# Patient Record
Sex: MALE | Race: WHITE | Employment: OTHER | URBAN - METROPOLITAN AREA
[De-identification: names, ages, dates, MRNs, and addresses within clinical notes are randomized per-mention and may not be internally consistent; named-entity substitution may affect disease eponyms.]

---

## 2017-12-27 ENCOUNTER — ED HISTORICAL/CONVERTED ENCOUNTER (OUTPATIENT)
Dept: OTHER | Age: 72
End: 2017-12-27

## 2018-05-07 ENCOUNTER — OP HISTORICAL/CONVERTED ENCOUNTER (OUTPATIENT)
Dept: OTHER | Age: 73
End: 2018-05-07

## 2018-11-19 ENCOUNTER — OP HISTORICAL/CONVERTED ENCOUNTER (OUTPATIENT)
Dept: OTHER | Age: 73
End: 2018-11-19

## 2019-02-26 ENCOUNTER — HOSPITAL ENCOUNTER (OUTPATIENT)
Dept: MRI IMAGING | Age: 74
Discharge: HOME | End: 2019-02-26
Attending: ORTHOPAEDIC SURGERY
Payer: MEDICARE

## 2019-02-26 DIAGNOSIS — M25.511 RIGHT SHOULDER PAIN: ICD-10-CM

## 2019-02-26 PROCEDURE — 73221 MRI JOINT UPR EXTREM W/O DYE: CPT

## 2019-06-13 ENCOUNTER — ED HISTORICAL/CONVERTED ENCOUNTER (OUTPATIENT)
Dept: OTHER | Age: 74
End: 2019-06-13

## 2019-06-23 ENCOUNTER — ED HISTORICAL/CONVERTED ENCOUNTER (OUTPATIENT)
Dept: OTHER | Age: 74
End: 2019-06-23

## 2019-06-26 ENCOUNTER — HOSPITAL ENCOUNTER (OUTPATIENT)
Dept: MRI IMAGING | Age: 74
Discharge: HOME | End: 2019-06-26
Attending: INTERNAL MEDICINE
Payer: MEDICARE

## 2019-06-26 DIAGNOSIS — R20.2 PARESTHESIA OF LEFT ARM: ICD-10-CM

## 2019-06-26 PROCEDURE — 70551 MRI BRAIN STEM W/O DYE: CPT

## 2019-09-30 ENCOUNTER — OP HISTORICAL/CONVERTED ENCOUNTER (OUTPATIENT)
Dept: OTHER | Age: 74
End: 2019-09-30

## 2019-10-28 ENCOUNTER — OP HISTORICAL/CONVERTED ENCOUNTER (OUTPATIENT)
Dept: OTHER | Age: 74
End: 2019-10-28

## 2019-10-31 ENCOUNTER — OP HISTORICAL/CONVERTED ENCOUNTER (OUTPATIENT)
Dept: OTHER | Age: 74
End: 2019-10-31

## 2019-11-11 ENCOUNTER — OP HISTORICAL/CONVERTED ENCOUNTER (OUTPATIENT)
Dept: OTHER | Age: 74
End: 2019-11-11

## 2020-07-16 DIAGNOSIS — N52.8 OTHER MALE ERECTILE DYSFUNCTION: ICD-10-CM

## 2020-07-16 DIAGNOSIS — N40.0 BENIGN PROSTATIC HYPERPLASIA WITHOUT LOWER URINARY TRACT SYMPTOMS: ICD-10-CM

## 2020-07-16 DIAGNOSIS — C61 MALIGNANT NEOPLASM OF PROSTATE (HCC): ICD-10-CM

## 2020-07-23 LAB — PSA SERPL-MCNC: 2.6 NG/ML (ref 0–4)

## 2020-07-28 ENCOUNTER — TELEPHONE (OUTPATIENT)
Dept: UROLOGY | Age: 75
End: 2020-07-28

## 2020-08-10 NOTE — PROGRESS NOTES
HISTORY OF PRESENT ILLNESS  Gualberto Vanegas is a 76 y.o. male. He is here for repeat biopsy. He underwent a prostate biopsy 8/31/18. 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 Prolaris testing was less aggressive. PSA has fluctuated. 2.4 3/6/19 on 5ARI. He has been on surveillance. PSA 9/17/19 was 3.1  PSA 3/11/2020 was 2.9  PSA 5/19/2020: 2.8  PSA 7/22/202: 2.6    Problem List  Never Reviewed          Codes Class Noted    Benign prostatic hyperplasia without lower urinary tract symptoms ICD-10-CM: N40.0  ICD-9-CM: 600.00  7/16/2020    Overview Addendum 7/27/2020  2:08 PM by Janet Byers NP     On uroxatral for BPH. He is also on alfuzosin and finasteride. His symptoms are not bothersome. PSA 7/22/2020 is 2.6             Malignant neoplasm of prostate [facility]) ICD-10-CM: C61  ICD-9-CM: 185  7/16/2020    Overview Addendum 8/10/2020 10:39 AM by Janet Byers NP     He underwent a prostate biopsy 8/31/18. 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 Prolaris testing was less aggressive. PSA has fluctuated. 2.4 3/6/19 on 5ARI. He has been on surveillance. PSA 9/17/19 was 3.1  PSA 3/11/2020 was 2.9  PSA 5/19/2020: 2.8  PSA 7/22/202: 2.6; encouraged to proceed with re-biopsy. Other male erectile dysfunction ICD-10-CM: N52.8  ICD-9-CM: 607.84  7/16/2020    Overview Signed 7/16/2020  3:47 PM by Nicholas Oakley LPN     OGE1D contraindicated due to nitrate use. His diabetes is likely also contributory. ROS  Physical Exam      ASSESSMENT and PLAN  Diagnoses and all orders for this visit:    1.  Malignant neoplasm of prostate ([facility] [address]         Jennifer Hutson NP

## 2020-08-11 ENCOUNTER — OFFICE VISIT (OUTPATIENT)
Dept: UROLOGY | Age: 75
End: 2020-08-11
Payer: MEDICARE

## 2020-08-11 VITALS — HEIGHT: 71 IN | WEIGHT: 200 LBS | TEMPERATURE: 98.5 F | BODY MASS INDEX: 28 KG/M2

## 2020-08-11 DIAGNOSIS — C61 MALIGNANT NEOPLASM OF PROSTATE (HCC): ICD-10-CM

## 2020-08-11 PROCEDURE — 76872 US TRANSRECTAL: CPT | Performed by: UROLOGY

## 2020-08-11 PROCEDURE — 55700 PR BIOPSY OF PROSTATE,NEEDLE/PUNCH: CPT | Performed by: UROLOGY

## 2020-08-11 PROCEDURE — 76942 ECHO GUIDE FOR BIOPSY: CPT | Performed by: UROLOGY

## 2020-08-11 RX ORDER — ISOSORBIDE MONONITRATE 30 MG/1
TABLET, EXTENDED RELEASE ORAL
COMMUNITY
Start: 2020-08-07

## 2020-08-11 RX ORDER — FLUCONAZOLE 100 MG/1
TABLET ORAL
COMMUNITY
Start: 2020-05-23 | End: 2021-02-23 | Stop reason: ALTCHOICE

## 2020-08-11 RX ORDER — ALFUZOSIN HYDROCHLORIDE 10 MG/1
TABLET, EXTENDED RELEASE ORAL
COMMUNITY
Start: 2020-05-26 | End: 2022-06-28 | Stop reason: SDUPTHER

## 2020-08-11 RX ORDER — GABAPENTIN 100 MG/1
CAPSULE ORAL
COMMUNITY
Start: 2020-05-26 | End: 2021-02-23 | Stop reason: ALTCHOICE

## 2020-08-11 RX ORDER — FOSINOPIRL SODIUM 10 MG/1
TABLET ORAL
COMMUNITY
Start: 2020-08-07

## 2020-08-11 RX ORDER — METFORMIN HYDROCHLORIDE 500 MG/1
TABLET ORAL
COMMUNITY
Start: 2020-08-07

## 2020-08-11 RX ORDER — CLOPIDOGREL BISULFATE 75 MG/1
TABLET ORAL
COMMUNITY
Start: 2020-06-06

## 2020-08-11 RX ORDER — CIPROFLOXACIN 500 MG/1
TABLET ORAL
COMMUNITY
Start: 2020-05-26 | End: 2021-02-23 | Stop reason: ALTCHOICE

## 2020-08-11 RX ORDER — FINASTERIDE 5 MG/1
TABLET, FILM COATED ORAL
COMMUNITY
Start: 2020-05-26 | End: 2021-01-20 | Stop reason: SDUPTHER

## 2020-08-11 RX ORDER — ATORVASTATIN CALCIUM 20 MG/1
TABLET, FILM COATED ORAL
COMMUNITY
Start: 2020-05-24

## 2020-08-11 RX ORDER — METOPROLOL SUCCINATE 25 MG/1
50 TABLET, EXTENDED RELEASE ORAL
COMMUNITY
Start: 2020-06-06 | End: 2020-12-04 | Stop reason: SDUPTHER

## 2020-08-11 RX ORDER — ENEMA 19; 7 G/133ML; G/133ML
ENEMA RECTAL
COMMUNITY
Start: 2020-05-26 | End: 2021-02-23 | Stop reason: ALTCHOICE

## 2020-08-11 NOTE — PATIENT INSTRUCTIONS
Use Tylenol for pain. Do NOT use medications such as aspirin, ibuprofen or Aleve for 7 days. Avoid dehydration. Drink plenty of water. Expect blood per the rectum 1-2 days. Expect intermittent blood in the urine or semen for weeks. No straining or heavy lifting for 2 days. Finish the antibiotics prescribed already.

## 2020-08-11 NOTE — PROGRESS NOTES
Prostate biopsy / transrectal ultrasound guidance/rectal ultrasonography    Indications: Elevated PSA, prostate cancer    Prep: Cipro 500 mg, fleets enema    Description: The patient was placed in the left lateral decubitus position. A digital rectal exam was performed. The multiplanar transrectal ultrasound probe was introduced and multiplanar echography was performed. The prostate measurements are as follows:  (cm)  Length: 3.91  Height: 3.98  Width: 5.23  Prostate volume (0.52 x L X W X H): 42.44 cc    There were prostate calcifications noted. There were no suspicious hypoechoic areas noted. Prostate block was performed with 1% lidocaine at the apex and base bilaterally for a total of 6 cc. Using a 18g core biopsy needle, samples were taken from apex to the base laterally and more medially in the standard 12 zone pattern. 14 samples were taken and submitted in 12 zones. The patient tolerated well. He was given postprocedural instructions.

## 2020-08-17 ENCOUNTER — TELEPHONE (OUTPATIENT)
Dept: UROLOGY | Age: 75
End: 2020-08-17

## 2020-08-17 NOTE — TELEPHONE ENCOUNTER
pts biopsy results are still pending, can you please call and reschedule him a couple days out, maybe Friday at 6?

## 2020-08-21 ENCOUNTER — OFFICE VISIT (OUTPATIENT)
Dept: UROLOGY | Age: 75
End: 2020-08-21
Payer: MEDICARE

## 2020-08-21 VITALS
BODY MASS INDEX: 28 KG/M2 | WEIGHT: 200 LBS | HEART RATE: 78 BPM | OXYGEN SATURATION: 96 % | TEMPERATURE: 97.4 F | HEIGHT: 71 IN

## 2020-08-21 DIAGNOSIS — N40.0 BENIGN PROSTATIC HYPERPLASIA WITHOUT LOWER URINARY TRACT SYMPTOMS: ICD-10-CM

## 2020-08-21 DIAGNOSIS — C61 MALIGNANT NEOPLASM OF PROSTATE (HCC): ICD-10-CM

## 2020-08-21 PROCEDURE — G8427 DOCREV CUR MEDS BY ELIG CLIN: HCPCS | Performed by: UROLOGY

## 2020-08-21 PROCEDURE — 99213 OFFICE O/P EST LOW 20 MIN: CPT | Performed by: UROLOGY

## 2020-08-21 NOTE — ASSESSMENT & PLAN NOTE
Low risk prostate cancer on biopsy 2018, not seen on repeat biopsy 8/11/20. Continue PSA monitoring. No concern.

## 2020-08-21 NOTE — PROGRESS NOTES
CC: The patient is here for routine follow up prostate biopsy, results. HISTORY OF PRESENT ILLNESS  Ronne Lombard is a 76 y.o. male. The patient has been healthy and notes no changes in urinary function. Chronic problems were reviewed and updated. He is s/p a prostate biopsy 8/11/20. His cancer was not detected, with benign tissue only. Problem List  Never Reviewed          Codes Class Noted    Benign prostatic hyperplasia without lower urinary tract symptoms ICD-10-CM: N40.0  ICD-9-CM: 600.00  7/16/2020    Overview Addendum 8/21/2020 11:43 AM by Quinton Sanderson MD     On uroxatral for BPH. He is also on alfuzosin and finasteride. His symptoms are not bothersome. PSA 7/22/2020 is 2.6. Prostate 42cc on u/s 8/11/2020. Malignant neoplasm of prostate [facility]) ICD-10-CM: C61  ICD-9-CM: 185  7/16/2020    Overview Addendum 8/21/2020 11:44 AM by Quinton Sanderson MD     He underwent a prostate biopsy 8/31/18. He had 1/12 zones positive 15% for Wiergate's 6 disease. He has been on surveillance. Prolaris testing was less aggressive. PSA has fluctuated. 2.4 3/6/19 on 5ARI. PSA 9/17/19 was 3.1  PSA 3/11/2020 was 2.9  PSA 5/19/2020: 2.8  PSA 7/22/202: 2.6  Prostate biopsy 8/11/20. Benign prostate on that biopsy. Reassuring. Other male erectile dysfunction ICD-10-CM: N52.8  ICD-9-CM: 607.84  7/16/2020    Overview Signed 7/16/2020  3:47 PM by Christiano Steele LPN     FDI8H contraindicated due to nitrate use. His diabetes is likely also contributory. Review of Systems   All other systems reviewed and are negative. Physical Exam  Constitutional:       Appearance: Normal appearance. HENT:      Head: Normocephalic and atraumatic. Nose: Nose normal.   Eyes:      Extraocular Movements: Extraocular movements intact.       Conjunctiva/sclera: Conjunctivae normal.   Pulmonary:      Effort: Pulmonary effort is normal.   Neurological:      Mental Status: He is alert and oriented to person, place, and time. Psychiatric:         Mood and Affect: Mood normal.         Behavior: Behavior normal.       ASSESSMENT and PLAN  Diagnoses and all orders for this visit:    1. Benign prostatic hyperplasia without lower urinary tract symptoms  Assessment & Plan:  Stable. Continue alfuzosin and finasteride. 2. Malignant neoplasm of prostate [facility])  Assessment & Plan:  Low risk prostate cancer on biopsy 2018, not seen on repeat biopsy 8/11/20. Continue PSA monitoring. No concern.           Volodymyr Cantu MD

## 2020-12-04 ENCOUNTER — HOSPITAL ENCOUNTER (EMERGENCY)
Age: 75
Discharge: HOME | End: 2020-12-04
Attending: EMERGENCY MEDICINE
Payer: MEDICARE

## 2020-12-04 ENCOUNTER — APPOINTMENT (OUTPATIENT)
Dept: CT IMAGING | Age: 75
End: 2020-12-04
Attending: EMERGENCY MEDICINE
Payer: MEDICARE

## 2020-12-04 VITALS
WEIGHT: 215 LBS | HEIGHT: 71 IN | TEMPERATURE: 97.9 F | DIASTOLIC BLOOD PRESSURE: 85 MMHG | RESPIRATION RATE: 16 BRPM | OXYGEN SATURATION: 98 % | SYSTOLIC BLOOD PRESSURE: 117 MMHG | HEART RATE: 67 BPM | BODY MASS INDEX: 30.1 KG/M2

## 2020-12-04 DIAGNOSIS — R03.0 ELEVATED BLOOD PRESSURE READING: Primary | ICD-10-CM

## 2020-12-04 LAB
ALBUMIN SERPL-MCNC: 3.3 G/DL (ref 3.5–5)
ALBUMIN/GLOB SERPL: 1 {RATIO} (ref 1.1–2.2)
ALP SERPL-CCNC: 75 U/L (ref 45–117)
ALT SERPL-CCNC: 16 U/L (ref 12–78)
ANION GAP SERPL CALC-SCNC: 5 MMOL/L (ref 5–15)
AST SERPL W P-5'-P-CCNC: 10 U/L (ref 15–37)
BILIRUB SERPL-MCNC: 0.7 MG/DL (ref 0.2–1)
BUN SERPL-MCNC: 17 MG/DL (ref 6–20)
BUN/CREAT SERPL: 14 (ref 12–20)
CA-I BLD-MCNC: 8.4 MG/DL (ref 8.5–10.1)
CHLORIDE SERPL-SCNC: 106 MMOL/L (ref 97–108)
CO2 SERPL-SCNC: 29 MMOL/L (ref 21–32)
CREAT SERPL-MCNC: 1.25 MG/DL (ref 0.7–1.3)
GLOBULIN SER CALC-MCNC: 3.3 G/DL (ref 2–4)
GLUCOSE SERPL-MCNC: 120 MG/DL (ref 65–100)
POTASSIUM SERPL-SCNC: 4.3 MMOL/L (ref 3.5–5.1)
PROT SERPL-MCNC: 6.6 G/DL (ref 6.4–8.2)
SODIUM SERPL-SCNC: 140 MMOL/L (ref 136–145)
TROPONIN I SERPL-MCNC: <0.05 NG/ML

## 2020-12-04 PROCEDURE — 36415 COLL VENOUS BLD VENIPUNCTURE: CPT

## 2020-12-04 PROCEDURE — 93005 ELECTROCARDIOGRAM TRACING: CPT

## 2020-12-04 PROCEDURE — 99285 EMERGENCY DEPT VISIT HI MDM: CPT

## 2020-12-04 PROCEDURE — 84484 ASSAY OF TROPONIN QUANT: CPT

## 2020-12-04 PROCEDURE — 80053 COMPREHEN METABOLIC PANEL: CPT

## 2020-12-04 PROCEDURE — 70450 CT HEAD/BRAIN W/O DYE: CPT

## 2020-12-04 RX ORDER — ASPIRIN 81 MG/1
TABLET ORAL DAILY
COMMUNITY

## 2020-12-04 RX ORDER — PANTOPRAZOLE SODIUM 40 MG/1
40 TABLET, DELAYED RELEASE ORAL DAILY
COMMUNITY

## 2020-12-04 RX ORDER — CLONIDINE HYDROCHLORIDE 0.1 MG/1
0.1 TABLET ORAL
Status: DISCONTINUED | OUTPATIENT
Start: 2020-12-04 | End: 2020-12-04 | Stop reason: HOSPADM

## 2020-12-04 RX ORDER — METOPROLOL SUCCINATE 25 MG/1
75 TABLET, EXTENDED RELEASE ORAL DAILY
Qty: 5 TAB | Refills: 0 | OUTPATIENT
Start: 2020-12-04 | End: 2020-12-07

## 2020-12-04 NOTE — ED PROVIDER NOTES
EMERGENCY DEPARTMENT HISTORY AND PHYSICAL EXAM      Date: 12/4/2020  Patient Name: Feliberto Stack      History of Presenting Illness     Chief Complaint   Patient presents with    Headache    Hypertension       History Provided By: Patient    HPI: Feliberto Stack, 76 y.o. male with a past medical history significant hypertension presents to the ED with cc of some mild smoldering headache over the past 7 to 10 days. Patient states that he has been driving his had some stress and thought that might be inducing his headache. He is also noted that he is had some elevated blood pressures. Patient said he had occasional chest pain at times specifically denies fever, chills, nausea, vomiting, shortness of breath, rash, diarrhea, night sweats. His symptoms are not present currently. There are no other complaints, changes, or physical findings at this time. PCP: Lanie Beal MD    Current Facility-Administered Medications   Medication Dose Route Frequency Provider Last Rate Last Dose    cloNIDine HCL (CATAPRES) tablet 0.1 mg  0.1 mg Oral NOW Bette Tran MD         Current Outpatient Medications   Medication Sig Dispense Refill    pantoprazole (PROTONIX) 40 mg tablet Take 40 mg by mouth daily.  aspirin delayed-release 81 mg tablet Take  by mouth daily.  metoprolol succinate (TOPROL-XL) 25 mg XL tablet Take 3 Tabs by mouth daily for 3 days.  5 Tab 0    alfuzosin SR (UROXATRAL) 10 mg SR tablet       atorvastatin (LIPITOR) 20 mg tablet       clopidogreL (PLAVIX) 75 mg tab       finasteride (PROSCAR) 5 mg tablet       fosinopriL (MONOPRIL) 10 mg tablet       gabapentin (NEURONTIN) 100 mg capsule       isosorbide mononitrate ER (IMDUR) 30 mg tablet       metFORMIN (GLUCOPHAGE) 500 mg tablet       ciprofloxacin HCl (CIPRO) 500 mg tablet       fluconazole (DIFLUCAN) 100 mg tablet TAKE 1 2 (ONE HALF) TABLET BY MOUTH TWICE DAILY      Fleet Enema 19-7 gram/118 mL enema          Past History Past Medical History:  Past Medical History:   Diagnosis Date    Benign prostatic hyperplasia without lower urinary tract symptoms 7/16/2020    On uroxatral for BPH. He is also on alfuzosin and finasteride. His symptoms are not bothersome.  Diabetes ([address]     Hypercholesterolemia     Hypertension     Malignant neoplasm of prostate ([facility] [address] 7/16/2020    He underwent a prostate biopsy 8/31/18. He had 1/12 zones positive 15% for Arrington's 6 disease. Prolaris testing was less aggressive. PSA has fluctuated. 2.4 3/6/19 on 5ARI. On surveillance, recommended repeat biopsy in Spring 2020. PSA 9/17/19 was 3.1 PSA 3/11/2020 was 2.9 PSA 5/19/2020: 2.8    Other male erectile dysfunction 7/16/2020    PDE5i contraindicated due to nitrate use. His diabetes is likely also contributory.  Stroke [facility])        Past Surgical History:  Past Surgical History:   Procedure Laterality Date    CARDIAC SURG PROCEDURE UNLIST      stent placement X4    HX BACK SURGERY      HX CARPAL TUNNEL RELEASE      HX ORTHOPAEDIC Right     finger surgery     HX WISDOM TEETH EXTRACTION      DC PROSTATE BIOPSY, NEEDLE, SATURATION SAMPLING  08/11/2020    TOTAL HIP ARTHROPLASTY         Family History:  Family History   Problem Relation Age of Onset   [de-identified] Cancer Mother         lung    Cancer Sister         X2 both with breast ca    Hypertension Sister     Diabetes Sister         X2    Hypertension Brother     Diabetes Brother     Heart Disease Brother        Social History:  Social History     Tobacco Use    Smoking status: Former Smoker     Years: 20.00    Smokeless tobacco: Never Used    Tobacco comment: 1-2 cigars per day   Substance Use Topics    Alcohol use: Not Currently    Drug use: Never       Allergies:  No Known Allergies      Review of Systems     Review of Systems   Constitutional: Negative. Negative for appetite change, chills, fatigue and fever. HENT: Negative. Negative for congestion and sinus pain.     Eyes: Negative. Negative for pain and visual disturbance. Respiratory: Positive for chest tightness. Negative for shortness of breath. Cardiovascular: Negative. Negative for chest pain. Gastrointestinal: Negative. Negative for abdominal pain, diarrhea, nausea and vomiting. Genitourinary: Negative. Negative for difficulty urinating. No discharge   Musculoskeletal: Negative. Negative for arthralgias. Skin: Negative. Negative for rash. Neurological: Positive for headaches. Negative for weakness. Hematological: Negative. Psychiatric/Behavioral: Negative. Negative for agitation. The patient is not nervous/anxious. All other systems reviewed and are negative. Physical Exam     Physical Exam  Vitals signs and nursing note reviewed. Constitutional:       General: He is not in acute distress. Appearance: He is well-developed. HENT:      Head: Normocephalic and atraumatic. Nose: Nose normal.      Mouth/Throat:      Mouth: Mucous membranes are moist.      Pharynx: Oropharynx is clear. No oropharyngeal exudate. Eyes:      General:         Right eye: No discharge. Left eye: No discharge. Conjunctiva/sclera: Conjunctivae normal.      Pupils: Pupils are equal, round, and reactive to light. Neck:      Musculoskeletal: Normal range of motion and neck supple. Cardiovascular:      Rate and Rhythm: Normal rate and regular rhythm. Chest Wall: PMI is not displaced. No thrill. Heart sounds: Normal heart sounds. No murmur. No friction rub. No gallop. Pulmonary:      Effort: Pulmonary effort is normal. No respiratory distress. Breath sounds: Normal breath sounds. No wheezing or rales. Chest:      Chest wall: No tenderness. Abdominal:      General: Bowel sounds are normal. There is no distension. Palpations: Abdomen is soft. There is no mass. Tenderness: There is no abdominal tenderness. There is no guarding or rebound.    Musculoskeletal: Normal range of motion. Lymphadenopathy:      Cervical: No cervical adenopathy. Skin:     General: Skin is warm and dry. Capillary Refill: Capillary refill takes less than 2 seconds. Findings: No erythema or rash. Neurological:      Mental Status: He is alert and oriented to person, place, and time. Cranial Nerves: No cranial nerve deficit. Coordination: Coordination normal.   Psychiatric:         Mood and Affect: Mood normal.         Behavior: Behavior normal.         Lab and Diagnostic Study Results     Labs -     Recent Results (from the past 12 hour(s))   METABOLIC PANEL, COMPREHENSIVE    Collection Time: 12/04/20  8:45 AM   Result Value Ref Range    Sodium 140 136 - 145 mmol/L    Potassium 4.3 3.5 - 5.1 mmol/L    Chloride 106 97 - 108 mmol/L    CO2 29 21 - 32 mmol/L    Anion gap 5 5 - 15 mmol/L    Glucose 120 (H) 65 - 100 mg/dL    BUN 17 6 - 20 mg/dL    Creatinine 1.25 0.70 - 1.30 mg/dL    BUN/Creatinine ratio 14 12 - 20      GFR est AA >60 >60 ml/min/1.73m2    GFR est non-AA 56 (L) >60 ml/min/1.73m2    Calcium 8.4 (L) 8.5 - 10.1 mg/dL    Bilirubin, total 0.7 0.2 - 1.0 mg/dL    AST (SGOT) 10 (L) 15 - 37 U/L    ALT (SGPT) 16 12 - 78 U/L    Alk. phosphatase 75 45 - 117 U/L    Protein, total 6.6 6.4 - 8.2 g/dL    Albumin 3.3 (L) 3.5 - 5.0 g/dL    Globulin 3.3 2.0 - 4.0 g/dL    A-G Ratio 1.0 (L) 1.1 - 2.2     TROPONIN I    Collection Time: 12/04/20  8:45 AM   Result Value Ref Range    Troponin-I, Qt. <0.05 <0.05 ng/mL       Radiologic Studies -   [unfilled]  CT Results  (Last 48 hours)               12/04/20 0905  CT HEAD WO CONT Final result    Impression:  IMPRESSION: Similar findings compared to MRI of the brain June 26, 2019. Narrative:  CT head. Axial images are reviewed along with reformatted sagittal/coronal images.     Dose reduction: All CT scans at this facility are performed using dose reduction   optimization techniques as appropriate to a performed exam including the   following-   automated exposure control, adjustments of mA and/or Kv according to patient   size, or use of iterative reconstructive technique. .       Bone windows demonstrate normal aeration imaged sinuses and mastoid air cells. Review of intracranial content reveals geographic encephalomalacia left   posterior parietal cerebrum. Similar finding noted on MRI of the brain June 26, 2019. Otherwise, there is preserved gray-white differentiation. Falx-based   calcification. No hydrocephalus. No gross intracranial hemorrhage. CXR Results  (Last 48 hours)    None          Medical Decision Making and ED Course   - I am the first and primary provider for this patient. - I reviewed the vital signs, available nursing notes, past medical history, past surgical history, family history and social history. - Initial assessment performed. The patients presenting problems have been discussed, and the staff are in agreement with the care plan formulated and outlined with them. I have encouraged them to ask questions as they arise throughout their visit. Vital Signs-Reviewed the patient's vital signs. Patient Vitals for the past 12 hrs:   Temp Pulse Resp BP SpO2   12/04/20 0848  67 18 (!) 167/95 99 %   12/04/20 0835 97.9 °F (36.6 °C) 73 18 (!) 157/98 98 %       EKG interpretation: (Preliminary): Performed at 9 AM, and read at 9:13 AM  Ventricular rate 69 bpm,  ms, QRS duration 86 ms,  ms. Potation: Normal sinus rhythm, low voltage QRS. Borderline EKG. Records Reviewed: Nursing Notes and Old Medical Records    The patient presents with headache with a differential diagnosis of  cluster headache, migraine, sinusitis, tension headache and vascular headache    ED Course: We will assess with basic labs and CT of the head. Patient has no focal neurologic deficits at this point time.     ED Course as of Dec 04 1123   Fri Dec 04, 2020   1118 11:18 AM  The patient states that their symptoms have resolved and they feel much better. There are no other new complaints at this time. His questions have been answered. We are awaiting final results and those will be reviewed with them when they become available. [CS]   0280 11:19 AM  The patient states that their symptoms have resolved and they feel much better. There are no other new complaints at this time. His questions have been answered. We are awaiting final results and those will be reviewed with them when they become available. [CS]      ED Course User Index  [CS] Jacky Vigil MD       Provider Notes (Medical Decision Making):   Patient's headache is resolved at this point time blood pressures of 160 will give a dose of clonidine and have him follow-up with his primary care physician. CT of the head did not show anything acute and his troponin is within normal limits. There is no evidence of acute kidney injury either. MDM           Consultations:       Consultations: - NONE        Procedures and Critical Care       Performed by: Jeyson Stein MD  PROCEDURES:  Procedures         HYPERTENSION COUNSELING: Education was provided to the patient today regarding their hypertension. Patient is made aware of their elevated blood pressure and is instructed to follow up this week with their Primary Care for a recheck. Patient is counseled regarding consequences of chronic, uncontrolled hypertension including kidney disease, heart disease, stroke or even death. Patient states their understanding and agrees to follow up this week. Additionally, during their visit, I discussed sodium restriction, maintaining ideal body weight and regular exercise program as physiologic means to achieve blood pressure control. The patient will strive towards this. Disposition     Disposition: Condition stable  DC- Adult Discharges: All of the diagnostic tests were reviewed and questions answered.  Diagnosis, care plan and treatment options were discussed. The patient understands the instructions and will follow up as directed. The patients results have been reviewed with them. They have been counseled regarding their diagnosis. The patient verbally convey understanding and agreement of the signs, symptoms, diagnosis, treatment and prognosis and additionally agrees to follow up as recommended with their PCP in 24 - 48 hours. They also agree with the care-plan and convey that all of their questions have been answered. I have also put together some discharge instructions for them that include: 1) educational information regarding their diagnosis, 2) how to care for their diagnosis at home, as well a 3) list of reasons why they would want to return to the ED prior to their follow-up appointment, should their condition change. DC-The patient was given verbal hypertension instructions    Discharged    Remove if not discharged  DISCHARGE PLAN:  1. Current Discharge Medication List      CONTINUE these medications which have NOT CHANGED    Details   pantoprazole (PROTONIX) 40 mg tablet Take 40 mg by mouth daily. aspirin delayed-release 81 mg tablet Take  by mouth daily.       alfuzosin SR (UROXATRAL) 10 mg SR tablet       atorvastatin (LIPITOR) 20 mg tablet       clopidogreL (PLAVIX) 75 mg tab       finasteride (PROSCAR) 5 mg tablet       fosinopriL (MONOPRIL) 10 mg tablet       gabapentin (NEURONTIN) 100 mg capsule       isosorbide mononitrate ER (IMDUR) 30 mg tablet       metFORMIN (GLUCOPHAGE) 500 mg tablet       metoprolol succinate (TOPROL-XL) 25 mg XL tablet 50 mg.      ciprofloxacin HCl (CIPRO) 500 mg tablet       fluconazole (DIFLUCAN) 100 mg tablet TAKE 1 2 (ONE HALF) TABLET BY MOUTH TWICE DAILY      Fleet Enema 19-7 gram/118 mL enema            2.   Follow-up Information     Follow up With Specialties Details Why Contact Info    Erica Razo MD Internal Medicine Call in 2 days  [address]  [phone]          3. Return to ED if worse   4. Current Discharge Medication List      CONTINUE these medications which have CHANGED    Details   metoprolol succinate (TOPROL-XL) 25 mg XL tablet Take 3 Tabs by mouth daily for 3 days. Qty: 5 Tab, Refills: 0             Diagnosis     Clinical Impression:   1. Elevated blood pressure reading        Attestations:    Priya Lynn MD    Please note that this dictation was completed with [employer], the computer voice recognition software. Quite often unanticipated grammatical, syntax, homophones, and other interpretive errors are inadvertently transcribed by the computer software. Please disregard these errors. Please excuse any errors that have escaped final proofreading. Thank you.

## 2020-12-04 NOTE — ED TRIAGE NOTES
Pt reports headaches for approx 1 wk, pt states bp has been more elevated than normal. Reports recent travel to [facility].

## 2020-12-05 LAB
ATRIAL RATE: 69 BPM
CALCULATED P AXIS, ECG09: 59 DEGREES
CALCULATED R AXIS, ECG10: 20 DEGREES
CALCULATED T AXIS, ECG11: 48 DEGREES
DIAGNOSIS, 93000: NORMAL
P-R INTERVAL, ECG05: 172 MS
Q-T INTERVAL, ECG07: 394 MS
QRS DURATION, ECG06: 86 MS
QTC CALCULATION (BEZET), ECG08: 422 MS
VENTRICULAR RATE, ECG03: 69 BPM

## 2021-01-20 ENCOUNTER — TELEPHONE (OUTPATIENT)
Dept: UROLOGY | Age: 76
End: 2021-01-20

## 2021-01-20 RX ORDER — FINASTERIDE 5 MG/1
5 TABLET, FILM COATED ORAL DAILY
Qty: 90 TAB | Refills: 3 | Status: SHIPPED | OUTPATIENT
Start: 2021-01-20 | End: 2022-01-14 | Stop reason: SDUPTHER

## 2021-01-20 RX ORDER — FINASTERIDE 5 MG/1
5 TABLET, FILM COATED ORAL DAILY
COMMUNITY
End: 2021-01-20 | Stop reason: SDUPTHER

## 2021-01-20 NOTE — TELEPHONE ENCOUNTER
MrFranky stopped by to get a prescription refill on his Finasteride 5MG 90 day supply with 3 refills today. I explained to him that I would send a request on his behalf to Dr. Jamar Young for a refill to be electronically sent to [address]

## 2021-02-11 LAB — PSA SERPL-MCNC: 2.6 NG/ML (ref 0–4)

## 2021-02-18 PROBLEM — E11.8 CONTROLLED TYPE 2 DIABETES MELLITUS WITH COMPLICATION, WITHOUT LONG-TERM CURRENT USE OF INSULIN (HCC): Status: ACTIVE | Noted: 2021-02-18

## 2021-02-21 DIAGNOSIS — C61 MALIGNANT NEOPLASM OF PROSTATE (HCC): ICD-10-CM

## 2021-02-23 ENCOUNTER — OFFICE VISIT (OUTPATIENT)
Dept: UROLOGY | Age: 76
End: 2021-02-23
Payer: MEDICARE

## 2021-02-23 VITALS — HEIGHT: 71 IN | BODY MASS INDEX: 29.68 KG/M2 | TEMPERATURE: 97.1 F | WEIGHT: 212 LBS

## 2021-02-23 DIAGNOSIS — N52.8 OTHER MALE ERECTILE DYSFUNCTION: ICD-10-CM

## 2021-02-23 DIAGNOSIS — C61 MALIGNANT NEOPLASM OF PROSTATE (HCC): Primary | ICD-10-CM

## 2021-02-23 DIAGNOSIS — E11.8 CONTROLLED TYPE 2 DIABETES MELLITUS WITH COMPLICATION, WITHOUT LONG-TERM CURRENT USE OF INSULIN (HCC): ICD-10-CM

## 2021-02-23 DIAGNOSIS — N40.0 BENIGN PROSTATIC HYPERPLASIA WITHOUT LOWER URINARY TRACT SYMPTOMS: ICD-10-CM

## 2021-02-23 PROCEDURE — G8536 NO DOC ELDER MAL SCRN: HCPCS | Performed by: UROLOGY

## 2021-02-23 PROCEDURE — 99213 OFFICE O/P EST LOW 20 MIN: CPT | Performed by: UROLOGY

## 2021-02-23 PROCEDURE — 3046F HEMOGLOBIN A1C LEVEL >9.0%: CPT | Performed by: UROLOGY

## 2021-02-23 PROCEDURE — G8419 CALC BMI OUT NRM PARAM NOF/U: HCPCS | Performed by: UROLOGY

## 2021-02-23 PROCEDURE — G8432 DEP SCR NOT DOC, RNG: HCPCS | Performed by: UROLOGY

## 2021-02-23 PROCEDURE — 2022F DILAT RTA XM EVC RTNOPTHY: CPT | Performed by: UROLOGY

## 2021-02-23 PROCEDURE — 1101F PT FALLS ASSESS-DOCD LE1/YR: CPT | Performed by: UROLOGY

## 2021-02-23 PROCEDURE — 3017F COLORECTAL CA SCREEN DOC REV: CPT | Performed by: UROLOGY

## 2021-02-23 PROCEDURE — G8427 DOCREV CUR MEDS BY ELIG CLIN: HCPCS | Performed by: UROLOGY

## 2021-02-23 RX ORDER — METOPROLOL TARTRATE 50 MG/1
TABLET ORAL 2 TIMES DAILY
COMMUNITY

## 2021-02-23 RX ORDER — HYDROCHLOROTHIAZIDE 12.5 MG/1
12.5 CAPSULE ORAL DAILY
COMMUNITY

## 2021-02-23 NOTE — PROGRESS NOTES
HISTORY OF PRESENT ILLNESS Harley Campos is a 76 y.o. male. Chief Complaint Patient presents with  Follow-up  Prostate Cancer  Benign Prostatic Hypertrophy He notes no changes in urinary or sexual function. His last PSA was 2/10/21 and was 2.6 (on finasteride). He and his wife have completed the COVID-19 vaccinations. He noted some soreness at the injection site for several days afterward, but did not have any last effects. He feels well today. Chronic Conditions Addressed Today 1. Benign prostatic hyperplasia without lower urinary tract symptoms Overview On uroxatral for BPH. He is also on alfuzosin and finasteride. His symptoms are not bothersome. Prostate 42cc on u/s 8/11/2020. Relevant Medications  
  hydroCHLOROthiazide (MICROZIDE) 12.5 mg capsule 2. Malignant neoplasm of prostate ([facility] [address] - Primary Overview He underwent a prostate biopsy 8/31/18. He had 1/12 zones positive 15% for Jesus's 6 disease. He has been on surveillance. Prolaris testing was less aggressive. PSA has fluctuated. 2.4 3/6/19 on 5ARI. PSA 9/17/19 was 3.1 PSA 3/11/2020 was 2.9 PSA 5/19/2020: 2.8 PSA 7/22/2020: 2.6 Prostate biopsy 8/11/20. Benign prostate on that biopsy. Reassuring. PSA 2/10/2021: 2.6 (on finasteride) Relevant Medications  
  hydroCHLOROthiazide (MICROZIDE) 12.5 mg capsule 3. Other male erectile dysfunction Overview PDE5i contraindicated due to nitrate use. His diabetes is likely also contributory. Physical Exam 
Review of Systems Constitutional: Negative for chills and fever. HENT: Negative for sinus pain and sore throat. Respiratory: Negative for cough, sputum production and shortness of breath. Gastrointestinal: Negative for diarrhea, nausea and vomiting. Musculoskeletal: Negative for myalgias. Neurological: Negative for headaches.   
 
 
 
 
 
Erica Ortega NP

## 2021-02-23 NOTE — ASSESSMENT & PLAN NOTE
He is on active surveillance for prostate cancer. He was diagnosed 8/31/18. PSA is stable at 2.6. PSA in 6m.

## 2021-02-23 NOTE — PROGRESS NOTES
HISTORY OF PRESENT ILLNESS  Antoine Bui is a 76 y.o. male. Chief Complaint   Patient presents with    Follow-up    Prostate Cancer    Benign Prostatic Hypertrophy     He notes no changes in urinary or sexual function. He occasionally has a slow stream.  Occasionally wakes in the early morning. His last PSA was 2/10/21 and was 2.6 (on finasteride). He and his wife have completed the COVID-19 vaccinations. He noted some soreness at the injection site for several days afterward, but did not have any last effects. He feels well today. He had the Moderna vaccine. Chronic Conditions Addressed Today     1. Controlled type 2 diabetes mellitus with complication, without long-term current use of insulin (HCC)     Current Assessment & Plan      He has says his BS averages 110.           2. Benign prostatic hyperplasia without lower urinary tract symptoms     Overview      On uroxatral for BPH. He is also on alfuzosin and finasteride. His symptoms are not bothersome. Prostate 42cc on u/s 8/11/2020. Current Assessment & Plan      Stable on alfuzosin and finasteride. Continue. Relevant Medications     hydroCHLOROthiazide (MICROZIDE) 12.5 mg capsule    3. Malignant neoplasm of prostate [facility]) - Primary     Overview      He underwent a prostate biopsy 8/31/18. He had 1/12 zones positive 15% for [place]'s 6 disease. He has been on surveillance. Prolaris testing was less aggressive. PSA has fluctuated. 2.4 3/6/19 on 5ARI. PSA 9/17/19 was 3.1  PSA 3/11/2020 was 2.9  PSA 5/19/2020: 2.8  PSA 7/22/2020: 2.6  Prostate biopsy 8/11/20. Benign prostate tissue on that biopsy. Reassuring. PSA 2/10/2021: 2.6 (on finasteride)            Current Assessment & Plan      He is on active surveillance for prostate cancer. He was diagnosed 8/31/18. PSA is stable at 2.6. PSA in 6m.             Relevant Medications     hydroCHLOROthiazide (MICROZIDE) 12.5 mg capsule     Other Relevant Orders PSA, DIAGNOSTIC (PROSTATE SPECIFIC AG)    4. Other male erectile dysfunction     Overview      PDE5i contraindicated due to nitrate use. His diabetes is likely also contributory. Current Assessment & Plan      No longer interested in treatment               Review of Systems   Eyes:        Needs cataract surgery   All other systems reviewed and are negative. Physical Exam  Constitutional:       Appearance: Normal appearance. HENT:      Head: Normocephalic and atraumatic. Nose: Nose normal.   Eyes:      Extraocular Movements: Extraocular movements intact. Conjunctiva/sclera: Conjunctivae normal.   Pulmonary:      Effort: Pulmonary effort is normal.   Neurological:      Mental Status: He is alert and oriented to person, place, and time. Psychiatric:         Mood and Affect: Mood normal.         Behavior: Behavior normal.                   ASSESSMENT and PLAN  Diagnoses and all orders for this visit:    1. Malignant neoplasm of prostate [facility])  Assessment & Plan:  He is on active surveillance for prostate cancer. He was diagnosed 8/31/18. PSA is stable at 2.6. PSA in 6m. Orders:  -     PSA, DIAGNOSTIC (PROSTATE SPECIFIC AG); Future    2. Benign prostatic hyperplasia without lower urinary tract symptoms  Assessment & Plan:  Stable on alfuzosin and finasteride. Continue.        3. Controlled type 2 diabetes mellitus with complication, without long-term current use of insulin ([address]  Assessment & Plan:  He has says his BS averages 110.        4. Other male erectile dysfunction  Assessment & Plan:  No longer interested in treatment               Cristobal Shaw MD

## 2021-06-10 NOTE — DISCHARGE INSTRUCTIONS
Thank you! Thank you for allowing me to care for you in the emergency department. I sincerely hope that you are satisfied with your visit today. It is my goal to provide you with excellent care. Below you will find a list of your labs and imaging from your visit today. Should you have any questions regarding these results please do not hesitate to call the emergency department. Labs -     Recent Results (from the past 12 hour(s))   METABOLIC PANEL, COMPREHENSIVE    Collection Time: 12/04/20  8:45 AM   Result Value Ref Range    Sodium 140 136 - 145 mmol/L    Potassium 4.3 3.5 - 5.1 mmol/L    Chloride 106 97 - 108 mmol/L    CO2 29 21 - 32 mmol/L    Anion gap 5 5 - 15 mmol/L    Glucose 120 (H) 65 - 100 mg/dL    BUN 17 6 - 20 mg/dL    Creatinine 1.25 0.70 - 1.30 mg/dL    BUN/Creatinine ratio 14 12 - 20      GFR est AA >60 >60 ml/min/1.73m2    GFR est non-AA 56 (L) >60 ml/min/1.73m2    Calcium 8.4 (L) 8.5 - 10.1 mg/dL    Bilirubin, total 0.7 0.2 - 1.0 mg/dL    AST (SGOT) 10 (L) 15 - 37 U/L    ALT (SGPT) 16 12 - 78 U/L    Alk. phosphatase 75 45 - 117 U/L    Protein, total 6.6 6.4 - 8.2 g/dL    Albumin 3.3 (L) 3.5 - 5.0 g/dL    Globulin 3.3 2.0 - 4.0 g/dL    A-G Ratio 1.0 (L) 1.1 - 2.2     TROPONIN I    Collection Time: 12/04/20  8:45 AM   Result Value Ref Range    Troponin-I, Qt. <0.05 <0.05 ng/mL       Radiologic Studies -   CT HEAD WO CONT   Final Result   IMPRESSION: Similar findings compared to MRI of the brain June 26, 2019. CT Results  (Last 48 hours)                 12/04/20 0905  CT HEAD WO CONT Final result    Impression:  IMPRESSION: Similar findings compared to MRI of the brain June 26, 2019. Narrative:  CT head. Axial images are reviewed along with reformatted sagittal/coronal images.     Dose reduction: All CT scans at this facility are performed using dose reduction   optimization techniques as appropriate to a performed exam including the   following-   automated exposure control, adjustments of mA and/or Kv according to patient   size, or use of iterative reconstructive technique. .       Bone windows demonstrate normal aeration imaged sinuses and mastoid air cells. Review of intracranial content reveals geographic encephalomalacia left   posterior parietal cerebrum. Similar finding noted on MRI of the brain June 26, 2019. Otherwise, there is preserved gray-white differentiation. Falx-based   calcification. No hydrocephalus. No gross intracranial hemorrhage. CXR Results  (Last 48 hours)      None               If you feel that you have not received excellent quality care or timely care, please ask to speak to the nurse manager. Please choose us in the future for your continued health care needs. ------------------------------------------------------------------------------------------------------------  The exam and treatment you received in the Emergency Department were for an urgent problem and are not intended as complete care. It is important that you follow-up with a doctor, nurse practitioner, or physician assistant to:  (1) confirm your diagnosis,  (2) re-evaluation of changes in your illness and treatment, and  (3) for ongoing care. If your symptoms become worse or you do not improve as expected and you are unable to reach your usual health care provider, you should return to the Emergency Department. We are available 24 hours a day. Please take your discharge instructions with you when you go to your follow-up appointment. If you have any problem arranging a follow-up appointment, contact the Emergency Department immediately. If a prescription has been provided, please have it filled as soon as possible to prevent a delay in treatment. Read the entire medication instruction sheet provided to you by the pharmacy.  If you have any questions or reservations about taking the medication due to side effects or interactions with other medications, please call your primary care physician or contact the ER to speak with the charge nurse. Make an appointment with your family doctor or the physician you were referred to for follow-up of this visit as instructed on your discharge paperwork, as this is a mandatory follow-up. Return to the ER if you are unable to be seen or if you are unable to be seen in a timely manner. If you have any problem arranging the follow-up visit, contact the Emergency Department immediately. Same as Pre-Op Diagnosis

## 2021-08-19 ENCOUNTER — TELEPHONE (OUTPATIENT)
Dept: UROLOGY | Age: 76
End: 2021-08-19

## 2021-08-21 LAB — PSA SERPL-MCNC: 2.9 NG/ML (ref 0–4)

## 2021-08-23 DIAGNOSIS — C61 MALIGNANT NEOPLASM OF PROSTATE (HCC): ICD-10-CM

## 2021-08-24 ENCOUNTER — OFFICE VISIT (OUTPATIENT)
Dept: UROLOGY | Age: 76
End: 2021-08-24
Payer: MEDICARE

## 2021-08-24 VITALS
WEIGHT: 205 LBS | DIASTOLIC BLOOD PRESSURE: 74 MMHG | TEMPERATURE: 96.9 F | BODY MASS INDEX: 27.77 KG/M2 | HEIGHT: 72 IN | OXYGEN SATURATION: 98 % | SYSTOLIC BLOOD PRESSURE: 127 MMHG | HEART RATE: 60 BPM | RESPIRATION RATE: 22 BRPM

## 2021-08-24 DIAGNOSIS — R97.20 ELEVATED PSA: ICD-10-CM

## 2021-08-24 DIAGNOSIS — C61 MALIGNANT NEOPLASM OF PROSTATE (HCC): Primary | ICD-10-CM

## 2021-08-24 DIAGNOSIS — N40.0 BENIGN PROSTATIC HYPERPLASIA WITHOUT LOWER URINARY TRACT SYMPTOMS: ICD-10-CM

## 2021-08-24 PROCEDURE — G8419 CALC BMI OUT NRM PARAM NOF/U: HCPCS | Performed by: UROLOGY

## 2021-08-24 PROCEDURE — G8432 DEP SCR NOT DOC, RNG: HCPCS | Performed by: UROLOGY

## 2021-08-24 PROCEDURE — G8428 CUR MEDS NOT DOCUMENT: HCPCS | Performed by: UROLOGY

## 2021-08-24 PROCEDURE — 99213 OFFICE O/P EST LOW 20 MIN: CPT | Performed by: UROLOGY

## 2021-08-24 PROCEDURE — G8536 NO DOC ELDER MAL SCRN: HCPCS | Performed by: UROLOGY

## 2021-08-24 NOTE — LETTER
8/24/2021    Patient: Alexandra Soliman   YOB: 1945   Date of Visit: 8/24/2021     Isaias Garcia MD  [address]  [address] [facility] [facility] [address]  Via Fax: [phone]    Dear Isaias Garcia MD,      Thank you for referring Mr. Richard Iraheta to [place] [address] for evaluation. My notes for this consultation are attached. If you have questions, please do not hesitate to call me. I look forward to following your patient along with you.       Sincerely,    Fabi Etienne MD

## 2021-08-24 NOTE — PROGRESS NOTES
HISTORY OF PRESENT ILLNESS  Davidson Angela is a 68 y.o. male. Chief Complaint   Patient presents with    Follow-up    Elevated PSA    Prostate Cancer    Benign Prostatic Hypertrophy     No changes to urination. He is planning on a right inguinal hernia repair with Dr. Alma Spears. Chronic Conditions Addressed Today     1. Benign prostatic hyperplasia without lower urinary tract symptoms     Overview      On uroxatral for BPH. He is also on alfuzosin and finasteride. His symptoms are not bothersome. Prostate 42cc on u/s 8/11/2020. Current Assessment & Plan      Stable. On alfuzosin and finasteride. Continue medications. 2. Malignant neoplasm of prostate ([facility] [address] - Primary     Overview      He underwent a prostate biopsy 8/31/18. He had 1/12 zones positive 15% for [place]'s 6 disease. He has been on surveillance. Prolaris testing was less aggressive. PSA has fluctuated. 2.4 3/6/19 on 5ARI. PSA 9/17/19 was 3.1  PSA 3/11/2020 was 2.9  PSA 5/19/2020: 2.8  PSA 7/22/2020: 2.6  Prostate biopsy 8/11/20 was benign. PSA 2/10/2021: 2.6 (on finasteride)  PSA 8/20/21: 2.9 (on finasteride)          Current Assessment & Plan      Low risk prostate cancer diagnosed in 2018, benign tissue on bx 8/11/20. His PSA has fluctuated. 2.9 on finasteride, within the prior range. Continue 6m monitoring. Relevant Orders     PSA, DIAGNOSTIC (PROSTATE SPECIFIC AG)    3. Elevated PSA     Overview      He has a diagnosis of prostate cancer. On 5ARI therapy his PSA is elevated. Current Assessment & Plan       Fluctuating PSA, 2.9 on 5ARI.   Continue to monitor          Relevant Orders     PSA, DIAGNOSTIC (PROSTATE SPECIFIC AG)          Past Medical History:    PMHx (including negatives):  has a past medical history of Benign prostatic hyperplasia without lower urinary tract symptoms (7/16/2020), Diabetes ([address] Hypercholesterolemia, Hypertension, Malignant neoplasm of prostate ([address] (7/16/2020), Other male erectile dysfunction (7/16/2020), and Stroke [facility]). PSurgHx:  has a past surgical history that includes hx carpal tunnel release; hx wisdom teeth extraction; pr prostate biopsy, needle, saturation sampling (08/11/2020); pr cardiac surg procedure unlist; hx back surgery; and hx orthopaedic (Right). PSocHx:  reports that he has quit smoking. He quit after 20.00 years of use. He has never used smokeless tobacco. He reports previous alcohol use. He reports that he does not use drugs. ROS  Physical Exam  No Known Allergies   Prior to Admission medications    Medication Sig Start Date End Date Taking? Authorizing Provider   metoprolol tartrate (LOPRESSOR) 50 mg tablet Take  by mouth two (2) times a day. Yes Provider, Historical   hydroCHLOROthiazide (MICROZIDE) 12.5 mg capsule Take 12.5 mg by mouth daily. Yes Provider, Historical   finasteride (PROSCAR) 5 mg tablet Take 1 Tab by mouth daily. 1/20/21  Yes Alisha Call NP   pantoprazole (PROTONIX) 40 mg tablet Take 40 mg by mouth daily. Yes Other, MD Shagufta   aspirin delayed-release 81 mg tablet Take  by mouth daily. Yes Other, MD Shagufta   alfuzosin SR (UROXATRAL) 10 mg SR tablet  5/26/20  Yes Provider, Historical   atorvastatin (LIPITOR) 20 mg tablet  5/24/20  Yes Provider, Historical   clopidogreL (PLAVIX) 75 mg tab  6/6/20  Yes Provider, Historical   fosinopriL (MONOPRIL) 10 mg tablet  8/7/20  Yes Provider, Historical   metFORMIN (GLUCOPHAGE) 500 mg tablet  8/7/20  Yes Provider, Historical   isosorbide mononitrate ER (IMDUR) 30 mg tablet  8/7/20   Provider, Historical        ASSESSMENT and PLAN  Diagnoses and all orders for this visit:    1. Malignant neoplasm of prostate [facility])  Assessment & Plan:  Low risk prostate cancer diagnosed in 2018, benign tissue on bx 8/11/20. His PSA has fluctuated. 2.9 on finasteride, within the prior range. Continue 6m monitoring. Orders:  -     PSA, DIAGNOSTIC (PROSTATE SPECIFIC AG); Future    2. Benign prostatic hyperplasia without lower urinary tract symptoms  Assessment & Plan:  Stable. On alfuzosin and finasteride. Continue medications. 3. Elevated PSA  Assessment & Plan:   Fluctuating PSA, 2.9 on 5ARI. Continue to monitor    Orders:  -     PSA, DIAGNOSTIC (PROSTATE SPECIFIC AG);  Future         Mitesh Herrmann MD

## 2021-08-24 NOTE — ASSESSMENT & PLAN NOTE
Low risk prostate cancer diagnosed in 2018, benign tissue on bx 8/11/20. His PSA has fluctuated. 2.9 on finasteride, within the prior range. Continue 6m monitoring.

## 2022-01-14 ENCOUNTER — TELEPHONE (OUTPATIENT)
Dept: UROLOGY | Age: 77
End: 2022-01-14

## 2022-01-14 DIAGNOSIS — N40.0 BENIGN PROSTATIC HYPERPLASIA WITHOUT LOWER URINARY TRACT SYMPTOMS: Primary | ICD-10-CM

## 2022-01-14 RX ORDER — FINASTERIDE 5 MG/1
5 TABLET, FILM COATED ORAL DAILY
Qty: 90 TABLET | Refills: 3 | Status: SHIPPED | OUTPATIENT
Start: 2022-01-14

## 2022-01-14 NOTE — TELEPHONE ENCOUNTER
Patient called needs refill finasteride sent to [facility]. He will run out before his upcoming scheduled apt.

## 2022-02-10 LAB — PSA SERPL-MCNC: 3.4 NG/ML (ref 0–4)

## 2022-02-22 ENCOUNTER — OFFICE VISIT (OUTPATIENT)
Dept: UROLOGY | Age: 77
End: 2022-02-22
Payer: MEDICARE

## 2022-02-22 VITALS
HEART RATE: 73 BPM | SYSTOLIC BLOOD PRESSURE: 136 MMHG | HEIGHT: 72 IN | WEIGHT: 205 LBS | DIASTOLIC BLOOD PRESSURE: 86 MMHG | BODY MASS INDEX: 27.77 KG/M2

## 2022-02-22 DIAGNOSIS — R97.20 ELEVATED PSA: ICD-10-CM

## 2022-02-22 DIAGNOSIS — N40.0 BENIGN PROSTATIC HYPERPLASIA WITHOUT LOWER URINARY TRACT SYMPTOMS: ICD-10-CM

## 2022-02-22 DIAGNOSIS — C61 MALIGNANT NEOPLASM OF PROSTATE (HCC): Primary | ICD-10-CM

## 2022-02-22 PROCEDURE — G8419 CALC BMI OUT NRM PARAM NOF/U: HCPCS | Performed by: UROLOGY

## 2022-02-22 PROCEDURE — G8427 DOCREV CUR MEDS BY ELIG CLIN: HCPCS | Performed by: UROLOGY

## 2022-02-22 PROCEDURE — G8536 NO DOC ELDER MAL SCRN: HCPCS | Performed by: UROLOGY

## 2022-02-22 PROCEDURE — G8510 SCR DEP NEG, NO PLAN REQD: HCPCS | Performed by: UROLOGY

## 2022-02-22 PROCEDURE — 99214 OFFICE O/P EST MOD 30 MIN: CPT | Performed by: UROLOGY

## 2022-02-22 NOTE — PROGRESS NOTES
Chief Complaint   Patient presents with    Follow-up    Prostate Cancer    Benign Prostatic Hypertrophy    Elevated PSA     1. Have you been to the ER, urgent care clinic since your last visit? Hospitalized since your last visit? No    2. Have you seen or consulted any other health care providers outside of the [address] [place] [address] since your last visit? Include any pap smears or colon screening.  No  Visit Vitals  /86 (BP 1 Location: Left upper arm, BP Patient Position: Sitting, BP Cuff Size: Adult)   Pulse 73   Ht 6' (1.829 m)   Wt 205 lb (93 kg)   BMI 27.80 kg/m²

## 2022-02-22 NOTE — PROGRESS NOTES
HISTORY OF PRESENT ILLNESS  Chalo Dickerson is a 68 y.o. male. has a past medical history of Benign prostatic hyperplasia without lower urinary tract symptoms (7/16/2020), Diabetes ([facility] Hypercholesterolemia, Hypertension, Malignant neoplasm of prostate ([facility] (7/16/2020), Other male erectile dysfunction (7/16/2020), and Stroke [facility]). has a past surgical history that includes hx carpal tunnel release; hx wisdom teeth extraction; pr prostate biopsy, needle, saturation sampling (08/11/2020); pr cardiac surg procedure unlist; hx back surgery; and hx orthopaedic (Right). Chief Complaint   Patient presents with    Follow-up    Prostate Cancer    Benign Prostatic Hypertrophy    Elevated PSA     HPI  Chronic Conditions Addressed Today     1. Benign prostatic hyperplasia without lower urinary tract symptoms     Overview      On uroxatral for BPH. He is also on alfuzosin and finasteride. His symptoms are not bothersome. Prostate 42cc on u/s 8/11/2020.     8/24/21: stable on alfuzosin and finasteride. Current Assessment & Plan      Enlarged prostate. Stable symptoms. On alfuzosin and finasteride         2. Malignant neoplasm of prostate [facility]) - Primary     Overview      He underwent a prostate biopsy 8/31/18. He had 1/12 zones positive 15% for [place]'s 6 disease. He has been on surveillance. Prolaris testing was less aggressive. PSA has fluctuated. 2.4 3/6/19 on 5ARI. PSA 9/17/19 was 3.1  PSA 3/11/2020 was 2.9  PSA 5/19/2020: 2.8  PSA 7/22/2020: 2.6  Prostate biopsy 8/11/20 was benign. PSA 2/10/2021: 2.6 (on finasteride)  PSA 8/20/21: 2.9 (on finasteride)  PSA 2/9/22: 3.4 (on finasteride)          Current Assessment & Plan      He has a h/o prostate cancer 8/31/18. PSA has been 2.4 on 5ARI, gradual rise to 3.4 over the past year. Options include observation of trends, MR prostate vs repeat biopsy. He is ok to observe. Plan on PSA f/t in 6 months.           3. Elevated PSA     Overview      He has a diagnosis of prostate cancer. On 5ARI therapy his PSA is elevated. Current Assessment & Plan      PSA 2/1/22 3.4 on 5 ABIGAIL. Relevant Orders     PSA, TOTAL &  FREE          Past Medical History:    PMHx (including negatives):  has a past medical history of Benign prostatic hyperplasia without lower urinary tract symptoms (7/16/2020), Diabetes ([facility] Hypercholesterolemia, Hypertension, Malignant neoplasm of prostate ([facility] (7/16/2020), Other male erectile dysfunction (7/16/2020), and Stroke [facility]). PSurgHx:  has a past surgical history that includes hx carpal tunnel release; hx wisdom teeth extraction; pr prostate biopsy, needle, saturation sampling (08/11/2020); pr cardiac surg procedure unlist; hx back surgery; and hx orthopaedic (Right). PSocHx:  reports that he has quit smoking. He quit after 20.00 years of use. He has never used smokeless tobacco. He reports previous alcohol use. He reports that he does not use drugs. ROS  Physical Exam  No Known Allergies   Prior to Admission medications    Medication Sig Start Date End Date Taking? Authorizing Provider   finasteride (PROSCAR) 5 mg tablet Take 1 Tablet by mouth daily. 1/14/22  Yes Min Pimentel NP   metoprolol tartrate (LOPRESSOR) 50 mg tablet Take  by mouth two (2) times a day. Yes Provider, Historical   hydroCHLOROthiazide (MICROZIDE) 12.5 mg capsule Take 12.5 mg by mouth daily. Yes Provider, Historical   pantoprazole (PROTONIX) 40 mg tablet Take 40 mg by mouth daily. Yes Other, MD Shagufta   aspirin delayed-release 81 mg tablet Take  by mouth daily.    Yes Other, MD Shagufta   alfuzosin SR (UROXATRAL) 10 mg SR tablet  5/26/20  Yes Provider, Historical   atorvastatin (LIPITOR) 20 mg tablet  5/24/20  Yes Provider, Historical   clopidogreL (PLAVIX) 75 mg tab  6/6/20  Yes Provider, Historical   fosinopriL (MONOPRIL) 10 mg tablet  8/7/20  Yes Provider, Historical   isosorbide mononitrate ER (IMDUR) 30 mg tablet  8/7/20  Yes Provider, Historical   metFORMIN (GLUCOPHAGE) 500 mg tablet  8/7/20  Yes Provider, Historical        ASSESSMENT and PLAN  Diagnoses and all orders for this visit:    1. Malignant neoplasm of prostate [facility])  Assessment & Plan:  He has a h/o prostate cancer 8/31/18. PSA has been 2.4 on 5ARI, gradual rise to 3.4 over the past year. Options include observation of trends, MR prostate vs repeat biopsy. He is ok to observe. Plan on PSA f/t in 6 months. 2. Elevated PSA  Assessment & Plan:  PSA 2/1/22 3.4 on 5 ABIGAIL. Orders:  -     PSA, TOTAL &  FREE; Future    3. Benign prostatic hyperplasia without lower urinary tract symptoms  Assessment & Plan:  Enlarged prostate. Stable symptoms.   On alfuzosin and finasteride           Virl Osgood, MD

## 2022-02-22 NOTE — ASSESSMENT & PLAN NOTE
He has a h/o prostate cancer 8/31/18. PSA has been 2.4 on 5ARI, gradual rise to 3.4 over the past year. Options include observation of trends, MR prostate vs repeat biopsy. He is ok to observe. Plan on PSA f/t in 6 months.

## 2022-02-22 NOTE — LETTER
2/22/2022    Patient: Selena Freeman   YOB: 1945   Date of Visit: 2/22/2022     Margot Escobar MD  [address]  [address] [facility] [facility] [address]  Via Fax: [phone]    Dear Margot Escobar MD,      Thank you for referring Mr. Tiesha Cole to [place] [address] for evaluation. My notes for this consultation are attached. If you have questions, please do not hesitate to call me. I look forward to following your patient along with you.       Sincerely,    Virl Osgood, MD

## 2022-02-24 DIAGNOSIS — C61 MALIGNANT NEOPLASM OF PROSTATE (HCC): ICD-10-CM

## 2022-02-24 DIAGNOSIS — R97.20 ELEVATED PSA: ICD-10-CM

## 2022-03-18 PROBLEM — N52.8 OTHER MALE ERECTILE DYSFUNCTION: Status: ACTIVE | Noted: 2020-07-16

## 2022-03-18 PROBLEM — R97.20 ELEVATED PSA: Status: ACTIVE | Noted: 2021-08-24

## 2022-03-19 PROBLEM — E11.8 CONTROLLED TYPE 2 DIABETES MELLITUS WITH COMPLICATION, WITHOUT LONG-TERM CURRENT USE OF INSULIN (HCC): Status: ACTIVE | Noted: 2021-02-18

## 2022-03-19 PROBLEM — C61 MALIGNANT NEOPLASM OF PROSTATE (HCC): Status: ACTIVE | Noted: 2020-07-16

## 2022-03-19 PROBLEM — N40.0 BENIGN PROSTATIC HYPERPLASIA WITHOUT LOWER URINARY TRACT SYMPTOMS: Status: ACTIVE | Noted: 2020-07-16

## 2022-06-28 ENCOUNTER — TELEPHONE (OUTPATIENT)
Dept: UROLOGY | Age: 77
End: 2022-06-28

## 2022-06-28 DIAGNOSIS — N40.0 BENIGN PROSTATIC HYPERPLASIA WITHOUT LOWER URINARY TRACT SYMPTOMS: Primary | ICD-10-CM

## 2022-06-28 RX ORDER — ALFUZOSIN HYDROCHLORIDE 10 MG/1
10 TABLET, EXTENDED RELEASE ORAL DAILY
Qty: 30 TABLET | Refills: 3 | Status: SHIPPED | OUTPATIENT
Start: 2022-06-28 | End: 2022-07-12 | Stop reason: SDUPTHER

## 2022-06-28 NOTE — TELEPHONE ENCOUNTER
Patient needed a refill on his alfuzosin SR (UROXATRAL) 10 mg SR tablet medication but he states that it is on backorder at [facility] .  He wanted to know if he cold take his uroxattral instead never

## 2022-07-11 NOTE — TELEPHONE ENCOUNTER
Patient says [facility] does not have ALFUZOSIN, needs [facility] sent [facility]    pls advise

## 2022-07-12 ENCOUNTER — TELEPHONE (OUTPATIENT)
Dept: UROLOGY | Age: 77
End: 2022-07-12

## 2022-07-12 DIAGNOSIS — N40.0 BENIGN PROSTATIC HYPERPLASIA WITHOUT LOWER URINARY TRACT SYMPTOMS: ICD-10-CM

## 2022-07-12 RX ORDER — ALFUZOSIN HYDROCHLORIDE 10 MG/1
10 TABLET, EXTENDED RELEASE ORAL DAILY
Qty: 30 TABLET | Refills: 3 | Status: SHIPPED | OUTPATIENT
Start: 2022-07-12 | End: 2022-07-12 | Stop reason: SDUPTHER

## 2022-07-12 RX ORDER — ALFUZOSIN HYDROCHLORIDE 10 MG/1
10 TABLET, EXTENDED RELEASE ORAL DAILY
Qty: 30 TABLET | Refills: 3 | Status: SHIPPED | OUTPATIENT
Start: 2022-07-12 | End: 2022-08-11

## 2022-07-12 NOTE — TELEPHONE ENCOUNTER
Patient left  requesting medication alfusosin to be sent to [facility] in [place]. I added [facility] to pharmacy list in chart, please let me know when completed.

## 2022-08-11 LAB
PSA FREE MFR SERPL: 17.8 %
PSA FREE SERPL-MCNC: 0.57 NG/ML
PSA SERPL-MCNC: 3.2 NG/ML (ref 0–4)

## 2022-08-11 NOTE — PROGRESS NOTES
HISTORY OF PRESENT ILLNESS  Yanick Lu is a 68 y.o. male. Chief Complaint   Patient presents with    Follow-up    Prostate Cancer    Elevated PSA    Benign Prostatic Hypertrophy     Past Medical History:  PMHx (including negatives):  has a past medical history of Benign prostatic hyperplasia without lower urinary tract symptoms (7/16/2020), Diabetes ([facility] [address] Hypercholesterolemia, Hypertension, Malignant neoplasm of prostate ([facility] [address] (7/16/2020), Other male erectile dysfunction (7/16/2020), and Stroke ([facility] [address] PSurgHx:  has a past surgical history that includes hx carpal tunnel release; hx wisdom teeth extraction; pr prostate biopsy, needle, saturation sampling (08/11/2020); pr cardiac surg procedure unlist; hx back surgery; and hx orthopaedic (Right). PSocHx:  reports that he has quit smoking. He has never used smokeless tobacco. He reports that he does not currently use alcohol. He reports that he does not use drugs. [place] 6 disease diagnosed 8/31/2018. He is on active surveillance and finasteride. PSA was stable on 8/10/2022 at 3.2. Prior to that it was 3.4 in February. He remains on alfuzosin and finasteride for BPH. Chronic Conditions Addressed Today       1. Benign prostatic hyperplasia without lower urinary tract symptoms     Overview      On uroxatral for BPH. He is also on alfuzosin and finasteride. His symptoms are not bothersome. Prostate 42cc on u/s 8/11/2020.     2/22/22: stable on alfuzosin and finasteride. Current Assessment & Plan       Stable on medication         2. Malignant neoplasm of prostate [facility]) - Primary     Overview      He underwent a prostate biopsy 8/31/18. He had 1/12 zones positive 15% for Jesus's 6 disease. He has been on surveillance. Prolaris testing was less aggressive. PSA has fluctuated. 2.4 3/6/19 on 5ARI. PSA 9/17/19 was 3.1  PSA 3/11/2020 was 2.9  PSA 5/19/2020: 2.8  PSA 7/22/2020: 2.6  Prostate biopsy 8/11/20 was benign.   PSA 2/10/2021: 2.6 (on finasteride)  PSA 8/20/21: 2.9 (on finasteride)  PSA 2/9/22: 3.4 (on finasteride)  PSA 8/10/22: 3.2 (on finasteride)          Current Assessment & Plan      He has a h/o prostate cancer 8/31/18. PSA with gradual rise. Stable now at 3.2 from 3.4 (on 5ARI). He has wished to observe. Plan on PSA and f/u in 6 months. Relevant Orders     PSA, DIAGNOSTIC (PROSTATE SPECIFIC AG)    3. Elevated PSA     Overview      He has a diagnosis of prostate cancer. On 5ARI therapy his PSA is elevated. Current Assessment & Plan       Elevated PSA adjusted for 5ARI use. H/o low risk prostate cancer          Relevant Orders     PSA, DIAGNOSTIC (PROSTATE SPECIFIC AG)            ROS  Patient denies the symptoms of COVID-19 per routine screening guidelines. Physical Exam  Constitutional:       General: He is not in acute distress. Appearance: Normal appearance. HENT:      Head: Normocephalic and atraumatic. Nose: Nose normal.   Eyes:      Extraocular Movements: Extraocular movements intact. Conjunctiva/sclera: Conjunctivae normal.   Pulmonary:      Effort: Pulmonary effort is normal. No respiratory distress. Abdominal:      Palpations: There is no mass. Hernia: No hernia is present. Genitourinary:     Penis: Normal. No phimosis, hypospadias, tenderness or swelling. Testes: Normal.         Right: Mass, tenderness or swelling not present. Right testis is descended. Left: Mass, tenderness or swelling not present. Left testis is descended. Epididymis:      Right: Not inflamed or enlarged. No tenderness. Left: Not inflamed or enlarged. No tenderness. Prostate: Enlarged (2+, benign). Not tender and no nodules present. Neurological:      Mental Status: He is alert and oriented to person, place, and time. Psychiatric:         Mood and Affect: Mood normal.         Behavior: Behavior normal.       ASSESSMENT and PLAN  Diagnoses and all orders for this visit:    1. Malignant neoplasm of prostate [facility])  Assessment & Plan:  He has a h/o prostate cancer 8/31/18. PSA with gradual rise. Stable now at 3.2 from 3.4 (on 5ARI). He has wished to observe. Plan on PSA and f/u in 6 months. Orders:  -     PSA, DIAGNOSTIC (PROSTATE SPECIFIC AG); Future    2. Elevated PSA  Assessment & Plan:   Elevated PSA adjusted for 5ARI use. H/o low risk prostate cancer    Orders:  -     PSA, DIAGNOSTIC (PROSTATE SPECIFIC AG); Future    3. Benign prostatic hyperplasia without lower urinary tract symptoms  Assessment & Plan:   Stable on medication           Follow-up and Dispositions    Return in about 6 months (around 2/26/2023) for PSA prior, with Dr. Pilar Peters. Alfred Luna may have a reminder for a \"due or due soon\" health maintenance. The patient has been encouraged to contact their primary care provider for follow-up on this health maintenance or other necessary and/or routine health screening.      Olesya Cosme MD

## 2022-08-22 DIAGNOSIS — R97.20 ELEVATED PSA: ICD-10-CM

## 2022-08-26 ENCOUNTER — OFFICE VISIT (OUTPATIENT)
Dept: UROLOGY | Age: 77
End: 2022-08-26
Payer: MEDICARE

## 2022-08-26 VITALS — HEIGHT: 72 IN | BODY MASS INDEX: 27.09 KG/M2 | WEIGHT: 200 LBS

## 2022-08-26 DIAGNOSIS — C61 MALIGNANT NEOPLASM OF PROSTATE (HCC): Primary | ICD-10-CM

## 2022-08-26 DIAGNOSIS — N40.0 BENIGN PROSTATIC HYPERPLASIA WITHOUT LOWER URINARY TRACT SYMPTOMS: ICD-10-CM

## 2022-08-26 DIAGNOSIS — R97.20 ELEVATED PSA: ICD-10-CM

## 2022-08-26 PROCEDURE — G8427 DOCREV CUR MEDS BY ELIG CLIN: HCPCS | Performed by: UROLOGY

## 2022-08-26 PROCEDURE — G8536 NO DOC ELDER MAL SCRN: HCPCS | Performed by: UROLOGY

## 2022-08-26 PROCEDURE — G8417 CALC BMI ABV UP PARAM F/U: HCPCS | Performed by: UROLOGY

## 2022-08-26 PROCEDURE — G8432 DEP SCR NOT DOC, RNG: HCPCS | Performed by: UROLOGY

## 2022-08-26 PROCEDURE — 99214 OFFICE O/P EST MOD 30 MIN: CPT | Performed by: UROLOGY

## 2022-08-26 NOTE — PROGRESS NOTES
Chief Complaint   Patient presents with    Follow-up    Prostate Cancer    Elevated PSA    Benign Prostatic Hypertrophy     1. Have you been to the ER, urgent care clinic since your last visit? Hospitalized since your last visit? No    2. Have you seen or consulted any other health care providers outside of the [address] [place] [address] since your last visit? Include any pap smears or colon screening.  No  Visit Vitals  Ht 6' (1.829 m)   Wt 200 lb (90.7 kg)   BMI 27.12 kg/m²

## 2022-08-26 NOTE — LETTER
8/26/2022    Patient: Gabby Bustos   YOB: 1945   Date of Visit: 8/26/2022     Michael Mckinney MD  [address] [facility]  [address] [place] [address]  Via Fax: [phone]    Dear Michael Mckinney MD,      Thank you for referring Mr. Jr Piña to [place] [address] for evaluation. My notes for this consultation are attached. If you have questions, please do not hesitate to call me. I look forward to following your patient along with you.       Sincerely,    Donya Gamble MD

## 2022-08-26 NOTE — ASSESSMENT & PLAN NOTE
He has a h/o prostate cancer 8/31/18. PSA with gradual rise. Stable now at 3.2 from 3.4 (on 5ARI). He has wished to observe. Plan on PSA and f/u in 6 months.

## 2023-01-09 ENCOUNTER — TELEPHONE (OUTPATIENT)
Dept: UROLOGY | Age: 78
End: 2023-01-09

## 2023-01-09 DIAGNOSIS — N40.0 BENIGN PROSTATIC HYPERPLASIA WITHOUT LOWER URINARY TRACT SYMPTOMS: ICD-10-CM

## 2023-01-09 RX ORDER — FINASTERIDE 5 MG/1
5 TABLET, FILM COATED ORAL DAILY
Qty: 90 TABLET | Refills: 3 | Status: SHIPPED | OUTPATIENT
Start: 2023-01-09

## 2023-01-09 NOTE — TELEPHONE ENCOUNTER
Patient requesting refill for FINASTERIDE to be sent to [facility] [place]     Please advise

## 2023-02-16 NOTE — PROGRESS NOTES
HISTORY OF PRESENT ILLNESS  Cuong Bernstein is a 68 y.o. male. Chief Complaint   Patient presents with    Follow-up    Elevated PSA    Prostate Cancer    Benign Prostatic Hypertrophy     Past Medical History:  PMHx (including negatives):  has a past medical history of Benign prostatic hyperplasia without lower urinary tract symptoms (07/16/2020), CAD (coronary artery disease), Diabetes ([facility] [address] Hypercholesterolemia, Hypertension, Malignant neoplasm of prostate ([facility] [address] (07/16/2020), Other male erectile dysfunction (07/16/2020), and Stroke ([facility] [address] PSurgHx:  has a past surgical history that includes hx carpal tunnel release; hx wisdom teeth extraction; pr prostate biopsy, needle, saturation sampling (08/11/2020); pr unlisted procedure cardiac surgery; hx back surgery; hx orthopaedic (Right); and hx coronary stent placement. PSocHx:  reports that he has quit smoking. His smoking use included cigarettes. He has never used smokeless tobacco. He reports that he does not currently use alcohol. He reports that he does not use drugs. Jesus 6 disease, on surveillance. PSA ranges from 2.4 to 3.4 on finasteride. He is also on alfuzosin for BPH. Most recent PSA 3.7 on 2/20/23. Chronic Conditions Addressed Today       1. Benign prostatic hyperplasia without lower urinary tract symptoms     Overview      He is on alfuzosin and finasteride. His symptoms are not bothersome. Prostate 42cc on u/s 8/11/2020. Current Assessment & Plan       He sometimes has a slow stream.  His on alfuzosin and finasteride. He may be facing a procedure. 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 Relevant Medications     alfuzosin SR (UROXATRAL) 10 mg SR tablet     Other Relevant Orders     PSA, TOTAL &  FREE    2. Malignant neoplasm of prostate [facility]) - Primary     Overview      He underwent a prostate biopsy 8/31/18. He had 1/12 zones positive 15% for Jesus's 6 disease. He has been on surveillance. Prolaris testing was less aggressive. PSA has fluctuated. 2.4 3/6/19 on 5ARI. PSA 9/17/19 was 3.1  PSA 3/11/2020 was 2.9  PSA 5/19/2020: 2.8  PSA 7/22/2020: 2.6  Prostate biopsy 8/11/20 was benign. PSA 2/10/2021: 2.6 (on finasteride)  PSA 8/20/21: 2.9 (on finasteride)  PSA 2/9/22: 3.4 (on finasteride)  PSA 8/10/22: 3.2 (on finasteride)          Current Assessment & Plan      PSA increased to 3.7 from 3.2, on finasteride. We will follow the trend a little longer. If changing then we will evaluate with prostate MRI. Relevant Medications     alfuzosin SR (UROXATRAL) 10 mg SR tablet     Other Relevant Orders     PSA, TOTAL &  FREE    3. Elevated PSA     Overview      He has a diagnosis of prostate cancer. On 5ARI therapy his PSA is elevated. Current Assessment & Plan      2/21/23 PSA 3.7 on finasteride. Relevant Medications     alfuzosin SR (UROXATRAL) 10 mg SR tablet     Other Relevant Orders     PSA, TOTAL &  FREE    4. CAD (coronary artery disease)     Current Assessment & Plan       H/o heart stents. On Plavix. Relevant Medications     alfuzosin SR (UROXATRAL) 10 mg SR tablet            ROS  Patient denies the symptoms of COVID-19 per routine screening guidelines. Physical Exam    ASSESSMENT and PLAN  Diagnoses and all orders for this visit:    1. Malignant neoplasm of prostate [facility])  Assessment & Plan:  PSA increased to 3.7 from 3.2, on finasteride. We will follow the trend a little longer. If changing then we will evaluate with prostate MRI. Orders:  -     PSA, TOTAL &  FREE; Future    2. Benign prostatic hyperplasia without lower urinary tract symptoms  Assessment & Plan:   He sometimes has a slow stream.  His on alfuzosin and finasteride. He may be facing a procedure. 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 Orders:  -     PSA, TOTAL &  FREE; Future    3. Elevated PSA  Assessment & Plan:  2/21/23 PSA 3.7 on finasteride. Orders:  -     PSA, TOTAL &  FREE; Future    4. Coronary artery disease involving native coronary artery of native heart without angina pectoris  Assessment & Plan:   H/o heart stents. On Plavix. Roxanne Matamoros may have a reminder for a \"due or due soon\" health maintenance. The patient has been encouraged to contact their primary care provider for follow-up on this health maintenance or other necessary and/or routine health screening. Enmanuel Redd MD     Please note that portions of this note were completed with Dragon dictation, the computer voice recognition software. Quite often unanticipated grammatical, syntax, homophones, and other interpretive errors are inadvertently transcribed by the computer software. Please disregard these errors and any other errors that may have escaped final proofreading. Thank you.

## 2023-02-18 DIAGNOSIS — C61 MALIGNANT NEOPLASM OF PROSTATE (HCC): ICD-10-CM

## 2023-02-18 DIAGNOSIS — R97.20 ELEVATED PSA: ICD-10-CM

## 2023-02-28 ENCOUNTER — OFFICE VISIT (OUTPATIENT)
Dept: UROLOGY | Age: 78
End: 2023-02-28
Payer: MEDICARE

## 2023-02-28 VITALS — HEIGHT: 72 IN | BODY MASS INDEX: 27.77 KG/M2 | WEIGHT: 205 LBS

## 2023-02-28 DIAGNOSIS — N40.0 BENIGN PROSTATIC HYPERPLASIA WITHOUT LOWER URINARY TRACT SYMPTOMS: ICD-10-CM

## 2023-02-28 DIAGNOSIS — C61 MALIGNANT NEOPLASM OF PROSTATE (HCC): Primary | ICD-10-CM

## 2023-02-28 DIAGNOSIS — R97.20 ELEVATED PSA: ICD-10-CM

## 2023-02-28 DIAGNOSIS — I25.10 CORONARY ARTERY DISEASE INVOLVING NATIVE CORONARY ARTERY OF NATIVE HEART WITHOUT ANGINA PECTORIS: ICD-10-CM

## 2023-02-28 PROCEDURE — G8417 CALC BMI ABV UP PARAM F/U: HCPCS | Performed by: UROLOGY

## 2023-02-28 PROCEDURE — 1123F ACP DISCUSS/DSCN MKR DOCD: CPT | Performed by: UROLOGY

## 2023-02-28 PROCEDURE — 1101F PT FALLS ASSESS-DOCD LE1/YR: CPT | Performed by: UROLOGY

## 2023-02-28 PROCEDURE — G8432 DEP SCR NOT DOC, RNG: HCPCS | Performed by: UROLOGY

## 2023-02-28 PROCEDURE — 99214 OFFICE O/P EST MOD 30 MIN: CPT | Performed by: UROLOGY

## 2023-02-28 PROCEDURE — G8536 NO DOC ELDER MAL SCRN: HCPCS | Performed by: UROLOGY

## 2023-02-28 PROCEDURE — G8427 DOCREV CUR MEDS BY ELIG CLIN: HCPCS | Performed by: UROLOGY

## 2023-02-28 RX ORDER — ALFUZOSIN HYDROCHLORIDE 10 MG/1
10 TABLET, EXTENDED RELEASE ORAL DAILY
COMMUNITY

## 2023-02-28 NOTE — ASSESSMENT & PLAN NOTE
PSA increased to 3.7 from 3.2, on finasteride. We will follow the trend a little longer. If changing then we will evaluate with prostate MRI.

## 2023-02-28 NOTE — ASSESSMENT & PLAN NOTE
He sometimes has a slow stream.  His on alfuzosin and finasteride. He may be facing a procedure. 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

## 2023-02-28 NOTE — LETTER
2/28/2023    Patient: Kourtney Macedo   YOB: 1945   Date of Visit: 2/28/2023     Sebastian Mckeon MD  [address] [facility]  [address] [place] [id number]  Via Fax: [phone]    Dear Sebastian Mckeon MD,      Thank you for referring Mr. Codie Villatoro to [place] [address] for evaluation. My notes for this consultation are attached. If you have questions, please do not hesitate to call me. I look forward to following your patient along with you.       Sincerely,    Deyainra Herbert MD

## 2023-02-28 NOTE — PROGRESS NOTES
Chief Complaint   Patient presents with    Follow-up    Elevated PSA    Prostate Cancer    Benign Prostatic Hypertrophy     1. Have you been to the ER, urgent care clinic since your last visit? Hospitalized since your last visit? No    2. Have you seen or consulted any other health care providers outside of the [address] [place] [address] since your last visit? Include any pap smears or colon screening.  No  Visit Vitals  Ht 6' (1.829 m)   Wt 205 lb (93 kg)   BMI 27.80 kg/m²

## 2023-04-22 DIAGNOSIS — C61 MALIGNANT NEOPLASM OF PROSTATE (HCC): Primary | ICD-10-CM

## 2023-04-22 DIAGNOSIS — N40.0 BENIGN PROSTATIC HYPERPLASIA WITHOUT LOWER URINARY TRACT SYMPTOMS: ICD-10-CM

## 2023-04-22 DIAGNOSIS — R97.20 ELEVATED PSA: ICD-10-CM

## 2023-05-22 RX ORDER — FINASTERIDE 5 MG/1
5 TABLET, FILM COATED ORAL DAILY
COMMUNITY
Start: 2023-01-09

## 2023-05-22 RX ORDER — ATORVASTATIN CALCIUM 20 MG/1
TABLET, FILM COATED ORAL
COMMUNITY
Start: 2020-05-24

## 2023-05-22 RX ORDER — ASPIRIN 81 MG/1
TABLET ORAL DAILY
COMMUNITY

## 2023-05-22 RX ORDER — ISOSORBIDE MONONITRATE 30 MG/1
TABLET, EXTENDED RELEASE ORAL
COMMUNITY
Start: 2020-08-07

## 2023-05-22 RX ORDER — PANTOPRAZOLE SODIUM 40 MG/1
40 TABLET, DELAYED RELEASE ORAL DAILY
COMMUNITY

## 2023-05-22 RX ORDER — ALFUZOSIN HYDROCHLORIDE 10 MG/1
10 TABLET, EXTENDED RELEASE ORAL DAILY
COMMUNITY

## 2023-05-22 RX ORDER — METOPROLOL TARTRATE 50 MG/1
TABLET, FILM COATED ORAL 2 TIMES DAILY
COMMUNITY

## 2023-05-22 RX ORDER — FOSINOPRIL SODIUM 10 MG/1
TABLET ORAL
COMMUNITY
Start: 2020-08-07

## 2023-05-22 RX ORDER — CLOPIDOGREL BISULFATE 75 MG/1
TABLET ORAL
COMMUNITY
Start: 2020-06-06

## 2023-05-22 RX ORDER — HYDROCHLOROTHIAZIDE 12.5 MG/1
12.5 CAPSULE, GELATIN COATED ORAL DAILY
COMMUNITY

## 2023-06-27 ENCOUNTER — TELEPHONE (OUTPATIENT)
Age: 78
End: 2023-06-27

## 2023-08-28 DIAGNOSIS — R97.20 ELEVATED PSA: ICD-10-CM

## 2023-08-28 DIAGNOSIS — N40.0 BENIGN PROSTATIC HYPERPLASIA WITHOUT LOWER URINARY TRACT SYMPTOMS: ICD-10-CM

## 2023-08-28 DIAGNOSIS — C61 MALIGNANT NEOPLASM OF PROSTATE (HCC): ICD-10-CM

## 2023-08-30 ENCOUNTER — TELEPHONE (OUTPATIENT)
Age: 78
End: 2023-08-30

## 2023-08-30 PROBLEM — R97.20 ELEVATED PSA: Status: ACTIVE | Noted: 2021-08-24

## 2023-08-30 PROBLEM — C61 MALIGNANT NEOPLASM OF PROSTATE (HCC): Status: ACTIVE | Noted: 2020-07-16

## 2023-08-30 PROBLEM — N52.8 OTHER MALE ERECTILE DYSFUNCTION: Status: ACTIVE | Noted: 2020-07-16

## 2023-08-30 NOTE — TELEPHONE ENCOUNTER
Patient needs PSA prior to upcoming apt date. Order is in. Please advise patient that he should have that done before apt date.

## 2023-08-31 LAB
PSA FREE MFR SERPL: 18.6 %
PSA FREE SERPL-MCNC: 0.67 NG/ML
PSA SERPL-MCNC: 3.6 NG/ML (ref 0–4)

## 2023-09-01 ENCOUNTER — OFFICE VISIT (OUTPATIENT)
Age: 78
End: 2023-09-01
Payer: MEDICARE

## 2023-09-01 VITALS
TEMPERATURE: 98.6 F | HEART RATE: 63 BPM | OXYGEN SATURATION: 100 % | RESPIRATION RATE: 16 BRPM | SYSTOLIC BLOOD PRESSURE: 135 MMHG | DIASTOLIC BLOOD PRESSURE: 80 MMHG

## 2023-09-01 DIAGNOSIS — R97.20 ELEVATED PSA: ICD-10-CM

## 2023-09-01 DIAGNOSIS — N52.8 OTHER MALE ERECTILE DYSFUNCTION: ICD-10-CM

## 2023-09-01 DIAGNOSIS — C61 MALIGNANT NEOPLASM OF PROSTATE (HCC): Primary | ICD-10-CM

## 2023-09-01 DIAGNOSIS — N40.0 BENIGN PROSTATIC HYPERPLASIA WITHOUT LOWER URINARY TRACT SYMPTOMS: ICD-10-CM

## 2023-09-01 PROCEDURE — G8419 CALC BMI OUT NRM PARAM NOF/U: HCPCS | Performed by: UROLOGY

## 2023-09-01 PROCEDURE — 4004F PT TOBACCO SCREEN RCVD TLK: CPT | Performed by: UROLOGY

## 2023-09-01 PROCEDURE — G8427 DOCREV CUR MEDS BY ELIG CLIN: HCPCS | Performed by: UROLOGY

## 2023-09-01 PROCEDURE — 99214 OFFICE O/P EST MOD 30 MIN: CPT | Performed by: UROLOGY

## 2023-09-01 PROCEDURE — 1123F ACP DISCUSS/DSCN MKR DOCD: CPT | Performed by: UROLOGY

## 2023-09-01 NOTE — ASSESSMENT & PLAN NOTE
H/o prostate cancer on surveillance. PSA is stable but elevated adjusted for 5ARI use. Discussed exosome test.  We will consider that at follow up.

## 2023-09-14 ENCOUNTER — HOSPITAL ENCOUNTER (EMERGENCY)
Facility: HOSPITAL | Age: 78
Discharge: HOME | End: 2023-09-14
Attending: STUDENT IN AN ORGANIZED HEALTH CARE EDUCATION/TRAINING PROGRAM
Payer: MEDICARE

## 2023-09-14 ENCOUNTER — APPOINTMENT (OUTPATIENT)
Facility: HOSPITAL | Age: 78
End: 2023-09-14
Payer: MEDICARE

## 2023-09-14 VITALS
SYSTOLIC BLOOD PRESSURE: 139 MMHG | RESPIRATION RATE: 17 BRPM | DIASTOLIC BLOOD PRESSURE: 80 MMHG | BODY MASS INDEX: 29.4 KG/M2 | HEART RATE: 61 BPM | TEMPERATURE: 97.7 F | WEIGHT: 210 LBS | HEIGHT: 71 IN | OXYGEN SATURATION: 98 %

## 2023-09-14 DIAGNOSIS — R07.89 OTHER CHEST PAIN: Primary | ICD-10-CM

## 2023-09-14 LAB
ALBUMIN SERPL-MCNC: 3.5 G/DL (ref 3.5–5)
ALBUMIN/GLOB SERPL: 1.2 (ref 1.1–2.2)
ALP SERPL-CCNC: 72 U/L (ref 45–117)
ALT SERPL-CCNC: 17 U/L (ref 12–78)
ANION GAP SERPL CALC-SCNC: 9 MMOL/L (ref 5–15)
AST SERPL W P-5'-P-CCNC: 12 U/L (ref 15–37)
BASOPHILS # BLD: 0 K/UL (ref 0–0.1)
BASOPHILS NFR BLD: 1 % (ref 0–1)
BILIRUB SERPL-MCNC: 0.5 MG/DL (ref 0.2–1)
BUN SERPL-MCNC: 21 MG/DL (ref 6–20)
BUN/CREAT SERPL: 15 (ref 12–20)
CA-I BLD-MCNC: 8.9 MG/DL (ref 8.5–10.1)
CHLORIDE SERPL-SCNC: 104 MMOL/L (ref 97–108)
CO2 SERPL-SCNC: 31 MMOL/L (ref 21–32)
CREAT SERPL-MCNC: 1.42 MG/DL (ref 0.7–1.3)
D DIMER PPP FEU-MCNC: 0.64 MG/L FEU (ref 0.19–0.5)
DIFFERENTIAL METHOD BLD: ABNORMAL
EKG ATRIAL RATE: 64 BPM
EKG DIAGNOSIS: NORMAL
EKG P AXIS: 73 DEGREES
EKG P-R INTERVAL: 186 MS
EKG Q-T INTERVAL: 408 MS
EKG QRS DURATION: 88 MS
EKG QTC CALCULATION (BAZETT): 420 MS
EKG R AXIS: 46 DEGREES
EKG T AXIS: 55 DEGREES
EKG VENTRICULAR RATE: 64 BPM
EOSINOPHIL # BLD: 0.1 K/UL (ref 0–0.4)
EOSINOPHIL NFR BLD: 2 % (ref 0–7)
ERYTHROCYTE [DISTWIDTH] IN BLOOD BY AUTOMATED COUNT: 11.4 % (ref 11.5–14.5)
GLOBULIN SER CALC-MCNC: 2.9 G/DL (ref 2–4)
GLUCOSE SERPL-MCNC: 154 MG/DL (ref 65–100)
HCT VFR BLD AUTO: 39.2 % (ref 36.6–50.3)
HGB BLD-MCNC: 13.5 G/DL (ref 12.1–17)
IMM GRANULOCYTES # BLD AUTO: 0 K/UL (ref 0–0.04)
IMM GRANULOCYTES NFR BLD AUTO: 0 % (ref 0–0.5)
LYMPHOCYTES # BLD: 1.6 K/UL (ref 0.8–3.5)
LYMPHOCYTES NFR BLD: 28 % (ref 12–49)
MCH RBC QN AUTO: 31.8 PG (ref 26–34)
MCHC RBC AUTO-ENTMCNC: 34.4 G/DL (ref 30–36.5)
MCV RBC AUTO: 92.2 FL (ref 80–99)
MONOCYTES # BLD: 0.5 K/UL (ref 0–1)
MONOCYTES NFR BLD: 9 % (ref 5–13)
NEUTS SEG # BLD: 3.5 K/UL (ref 1.8–8)
NEUTS SEG NFR BLD: 60 % (ref 32–75)
PLATELET # BLD AUTO: 146 K/UL (ref 150–400)
PMV BLD AUTO: 10.8 FL (ref 8.9–12.9)
POTASSIUM SERPL-SCNC: 4.1 MMOL/L (ref 3.5–5.1)
PROT SERPL-MCNC: 6.4 G/DL (ref 6.4–8.2)
RBC # BLD AUTO: 4.25 M/UL (ref 4.1–5.7)
SODIUM SERPL-SCNC: 144 MMOL/L (ref 136–145)
TROPONIN I SERPL HS-MCNC: 11 NG/L (ref 0–76)
TROPONIN I SERPL HS-MCNC: 9 NG/L (ref 0–76)
WBC # BLD AUTO: 5.8 K/UL (ref 4.1–11.1)

## 2023-09-14 PROCEDURE — 93005 ELECTROCARDIOGRAM TRACING: CPT | Performed by: STUDENT IN AN ORGANIZED HEALTH CARE EDUCATION/TRAINING PROGRAM

## 2023-09-14 PROCEDURE — 85379 FIBRIN DEGRADATION QUANT: CPT

## 2023-09-14 PROCEDURE — 84484 ASSAY OF TROPONIN QUANT: CPT

## 2023-09-14 PROCEDURE — 71045 X-RAY EXAM CHEST 1 VIEW: CPT

## 2023-09-14 PROCEDURE — 85025 COMPLETE CBC W/AUTO DIFF WBC: CPT

## 2023-09-14 PROCEDURE — 80053 COMPREHEN METABOLIC PANEL: CPT

## 2023-09-14 PROCEDURE — 99285 EMERGENCY DEPT VISIT HI MDM: CPT

## 2023-09-14 PROCEDURE — 36415 COLL VENOUS BLD VENIPUNCTURE: CPT

## 2023-09-14 ASSESSMENT — PAIN - FUNCTIONAL ASSESSMENT
PAIN_FUNCTIONAL_ASSESSMENT: 0-10

## 2023-09-14 ASSESSMENT — PAIN SCALES - GENERAL
PAINLEVEL_OUTOF10: 4
PAINLEVEL_OUTOF10: 6

## 2023-09-14 ASSESSMENT — PAIN DESCRIPTION - DESCRIPTORS
DESCRIPTORS: ACHING

## 2023-09-14 ASSESSMENT — PAIN DESCRIPTION - LOCATION
LOCATION: CHEST

## 2023-09-14 ASSESSMENT — HEART SCORE: ECG: 0

## 2023-09-14 NOTE — ED PROVIDER NOTES
Time: 09/14/23  8:50 AM   Result Value Ref Range    D-Dimer, Quant 0.64 (H) 0.19 - 0.50 mg/L FEU   Troponin    Collection Time: 09/14/23 10:23 AM   Result Value Ref Range    Troponin, High Sensitivity 9 0 - 76 ng/L       Radiologic Studies:  Non-plain film images such as CT, Ultrasound and MRI are read by the radiologist. Plain radiographic images are visualized and preliminarily interpreted by the ED Provider with the below findings:    Interpreted by me as no acute process    Interpretation per the Radiologist below, if available at the time of this note:  XR CHEST PORTABLE   Final Result      No acute process on portable chest.              Diagnosis     Clinical Impression:   1. Other chest pain        Disposition & Disposition Considerations     DISPOSITION Decision To Discharge 09/14/2023 10:49:55 AM      Discharge Note: The patient is stable for discharge. The signs, symptoms, diagnosis, and discharge instructions have been discussed, understanding conveyed between all parties, and agreed upon. Understanding was insured that at this time there is no evidence for a more malignant underlying process, but that early in the process of an illness, an emergency department workup can be falsely reassuring. Routine discharge counseling was given including the fact that any worsening, changing or persistent symptoms should prompt an immediate call or follow up with their primary physician or the emergency department. The importance of appropriate follow up was also discussed as was the importance of review of all lab work and imaging conducted in the emergency department with an outside physician. More extensive discharge instructions were given in the patient's discharge paperwork. After completion of evaluation and discussion of results and diagnoses, all the questions were answered. If required, all follow up appointments and treatments were discussed and explained.  Understanding was insured prior to

## 2023-09-14 NOTE — DISCHARGE INSTRUCTIONS
0.04 K/UL    Differential Type AUTOMATED     Comprehensive Metabolic Panel    Collection Time: 09/14/23  8:50 AM   Result Value Ref Range    Sodium 144 136 - 145 mmol/L    Potassium 4.1 3.5 - 5.1 mmol/L    Chloride 104 97 - 108 mmol/L    CO2 31 21 - 32 mmol/L    Anion Gap 9 5 - 15 mmol/L    Glucose 154 (H) 65 - 100 mg/dL    BUN 21 (H) 6 - 20 mg/dL    Creatinine 1.42 (H) 0.70 - 1.30 mg/dL    Bun/Cre Ratio 15 12 - 20      Est, Glom Filt Rate 51 (L) >60 ml/min/1.73m2    Calcium 8.9 8.5 - 10.1 mg/dL    Total Bilirubin 0.5 0.2 - 1.0 mg/dL    AST 12 (L) 15 - 37 U/L    ALT 17 12 - 78 U/L    Alk Phosphatase 72 45 - 117 U/L    Total Protein 6.4 6.4 - 8.2 g/dL    Albumin 3.5 3.5 - 5.0 g/dL    Globulin 2.9 2.0 - 4.0 g/dL    Albumin/Globulin Ratio 1.2 1.1 - 2.2     Troponin    Collection Time: 09/14/23  8:50 AM   Result Value Ref Range    Troponin, High Sensitivity 11 0 - 76 ng/L   D-Dimer, Quantitative    Collection Time: 09/14/23  8:50 AM   Result Value Ref Range    D-Dimer, Quant 0.64 (H) 0.19 - 0.50 mg/L FEU   Troponin    Collection Time: 09/14/23 10:23 AM   Result Value Ref Range    Troponin, High Sensitivity 9 0 - 76 ng/L       Radiologic Studies -   XR CHEST PORTABLE   Final Result      No acute process on portable chest.           [unfilled]  @[facility]@       If you feel that you have not received excellent quality care or timely care, please ask to speak to the nurse manager. Please choose us in the future for your continued health care needs. ------------------------------------------------------------------------------------------------------------  The exam and treatment you received in the Emergency Department were for an urgent problem and are not intended as complete care.  It is very important that you follow-up with a doctor, nurse practitioner, or physician assistant in a timely manner to:  (1) confirm your diagnosis and review all imaging and lab results,  (2) re-evaluation of changes in your illness and treatment,

## 2023-09-14 NOTE — ED TRIAGE NOTES
Chest pain radiating to his back for a few days , nausea noted, nothing makes better or worse, no chest tenderness,

## 2024-02-20 LAB — PSA SERPL-MCNC: 4 NG/ML (ref 0–4)

## 2024-03-01 ENCOUNTER — OFFICE VISIT (OUTPATIENT)
Age: 79
End: 2024-03-01
Payer: MEDICARE

## 2024-03-01 VITALS
HEIGHT: 71 IN | WEIGHT: 210 LBS | OXYGEN SATURATION: 98 % | DIASTOLIC BLOOD PRESSURE: 83 MMHG | BODY MASS INDEX: 29.4 KG/M2 | RESPIRATION RATE: 16 BRPM | HEART RATE: 68 BPM | SYSTOLIC BLOOD PRESSURE: 158 MMHG

## 2024-03-01 DIAGNOSIS — N40.0 BENIGN PROSTATIC HYPERPLASIA WITHOUT LOWER URINARY TRACT SYMPTOMS: ICD-10-CM

## 2024-03-01 DIAGNOSIS — R97.20 ELEVATED PSA: ICD-10-CM

## 2024-03-01 DIAGNOSIS — C61 MALIGNANT NEOPLASM OF PROSTATE (HCC): Primary | ICD-10-CM

## 2024-03-01 DIAGNOSIS — C61 MALIGNANT NEOPLASM OF PROSTATE (HCC): ICD-10-CM

## 2024-03-01 PROCEDURE — G8427 DOCREV CUR MEDS BY ELIG CLIN: HCPCS | Performed by: UROLOGY

## 2024-03-01 PROCEDURE — G8419 CALC BMI OUT NRM PARAM NOF/U: HCPCS | Performed by: UROLOGY

## 2024-03-01 PROCEDURE — 99214 OFFICE O/P EST MOD 30 MIN: CPT | Performed by: UROLOGY

## 2024-03-01 PROCEDURE — 1036F TOBACCO NON-USER: CPT | Performed by: UROLOGY

## 2024-03-01 PROCEDURE — G8484 FLU IMMUNIZE NO ADMIN: HCPCS | Performed by: UROLOGY

## 2024-03-01 PROCEDURE — 1123F ACP DISCUSS/DSCN MKR DOCD: CPT | Performed by: UROLOGY

## 2024-03-01 RX ORDER — FINASTERIDE 5 MG/1
5 TABLET, FILM COATED ORAL DAILY
Qty: 90 TABLET | Refills: 3 | Status: SHIPPED | OUTPATIENT
Start: 2024-03-01

## 2024-03-01 NOTE — PROGRESS NOTES
HISTORY OF PRESENT ILLNESS  Jeff Colin is a 78 y.o. male.   has a past medical history of Benign prostatic hyperplasia without lower urinary tract symptoms, CAD (coronary artery disease), Diabetes (HCC), Hypercholesterolemia, Hypertension, Other male erectile dysfunction, and Stroke (HCC).  has a past surgical history that includes pr unlisted procedure cardiac surgery; prostate biopsy, needle, saturation sampling (08/11/2020); [place] tooth extraction; Carpal tunnel release; back surgery; Coronary angioplasty with stent; and orthopedic surgery (Right).  Chief Complaint   Patient presents with    Erectile Dysfunction    Benign Prostatic Hypertrophy    Malignant neoplasm of prostate     He was diagnosed with prostate cancer in 2018.  He has been on surveillance.  He has been on finasteride.  His PSA is trending upward.  PSA 2/19/2024 was 4.0    His urinary symptoms are stable on alfuzosin and finasteride.        1. Malignant neoplasm of prostate (HCC)  Overview:  He underwent a prostate biopsy 8/31/18.   He had 1/12 zones positive 15% for Jae's 6 disease. He has been on surveillance.  Prolaris testing was less aggressive.  Repeat biopsy 8/11/20: benign.        Assessment & Plan:  Prostate cancer on surveillance.  PSA trending up.  Plan on urinary exosome test to assess risks.  Orders:  -     PSA, Diagnostic; Future  2. Elevated PSA  Overview:  He has a diagnosis of prostate cancer, 8/31/18, Jae 6. On 5ARI therapy.  2.4 3/6/19 on 5ARI.   PSA 9/17/19 was 3.1  PSA 3/11/2020 was 2.9  PSA 5/19/2020: 2.8  PSA 7/22/2020: 2.6  Prostate biopsy 8/11/20 was benign.  PSA 2/10/2021: 2.6 (on finasteride)  PSA 8/20/21: 2.9 (on finasteride)  PSA 2/9/22: 3.4 (on finasteride)  PSA 8/10/22: 3.2 (on finasteride)  PSA 2/20/23: 3.7  PSA 8/30/23: 3.6  PSA 2/19/24: 4.0    Assessment & Plan:  PSA increasing, on a 5 ANSON.  History of prostate cancer.  Urinary exosome test.  Orders:  -     PSA, Diagnostic; Future  3. Benign

## 2024-03-01 NOTE — PROGRESS NOTES
Chief Complaint   Patient presents with    Erectile Dysfunction    Benign Prostatic Hypertrophy    Malignant neoplasm of prostate     1. Have you been to the ER, urgent care clinic since your last visit?  Hospitalized since your last visit?No    2. Have you seen or consulted any other health care providers outside of the [facility] since your last visit?  Include any pap smears or colon screening. No  BP (!) 158/83 (Site: Right Upper Arm, Position: Sitting, Cuff Size: Large Adult)   Pulse 68   Resp 16   Ht 1.803 m (5' 11\")   Wt 95.3 kg (210 lb)   SpO2 98%   BMI 29.29 kg/m²

## 2024-08-24 LAB — PSA SERPL-MCNC: 3.7 NG/ML (ref 0–4)

## 2024-09-01 DIAGNOSIS — C61 MALIGNANT NEOPLASM OF PROSTATE (HCC): ICD-10-CM

## 2024-09-01 DIAGNOSIS — R97.20 ELEVATED PSA: ICD-10-CM

## 2024-09-06 ENCOUNTER — OFFICE VISIT (OUTPATIENT)
Age: 79
End: 2024-09-06
Payer: MEDICARE

## 2024-09-06 VITALS
HEART RATE: 61 BPM | BODY MASS INDEX: 26.88 KG/M2 | HEIGHT: 71 IN | DIASTOLIC BLOOD PRESSURE: 71 MMHG | WEIGHT: 192 LBS | SYSTOLIC BLOOD PRESSURE: 133 MMHG

## 2024-09-06 DIAGNOSIS — R97.20 ELEVATED PSA: ICD-10-CM

## 2024-09-06 DIAGNOSIS — N40.0 BENIGN PROSTATIC HYPERPLASIA WITHOUT LOWER URINARY TRACT SYMPTOMS: ICD-10-CM

## 2024-09-06 DIAGNOSIS — C61 MALIGNANT NEOPLASM OF PROSTATE (HCC): Primary | ICD-10-CM

## 2024-09-06 PROCEDURE — G8419 CALC BMI OUT NRM PARAM NOF/U: HCPCS | Performed by: UROLOGY

## 2024-09-06 PROCEDURE — 1036F TOBACCO NON-USER: CPT | Performed by: UROLOGY

## 2024-09-06 PROCEDURE — 99214 OFFICE O/P EST MOD 30 MIN: CPT | Performed by: UROLOGY

## 2024-09-06 PROCEDURE — G8427 DOCREV CUR MEDS BY ELIG CLIN: HCPCS | Performed by: UROLOGY

## 2024-09-06 PROCEDURE — 1123F ACP DISCUSS/DSCN MKR DOCD: CPT | Performed by: UROLOGY

## 2024-09-06 NOTE — PROGRESS NOTES
Chief Complaint   Patient presents with    Follow-up     Malignant neoplasm of prostate     1. Have you been to the ER, urgent care clinic since your last visit?  Hospitalized since your last visit?No    2. Have you seen or consulted any other health care providers outside of the [facility] since your last visit?  Include any pap smears or colon screening. No  /71   Pulse 61   Ht 1.803 m (5' 11\")   Wt 87.1 kg (192 lb)   BMI 26.78 kg/m²

## 2024-09-06 NOTE — ASSESSMENT & PLAN NOTE
H/o prostate cancer on surveillance.  No new concerns.  Given his age and social situation we are not pressed to reevaluate with biopsy.

## 2024-09-06 NOTE — PROGRESS NOTES
HISTORY OF PRESENT ILLNESS  Jeff Colin is a 79 y.o. male.   has a past medical history of Benign prostatic hyperplasia without lower urinary tract symptoms, CAD (coronary artery disease), Diabetes (HCC), Hypercholesterolemia, Hypertension, Other male erectile dysfunction, and Stroke (HCC).  has a past surgical history that includes pr unlisted procedure cardiac surgery; prostate biopsy, needle, saturation sampling (08/11/2020); [place] tooth extraction; Carpal tunnel release; back surgery; Coronary angioplasty with stent; and orthopedic surgery (Right).  Chief Complaint   Patient presents with    Follow-up     Malignant neoplasm of prostate     His wife has dementia which is wearing him down.  Her short term memory is going.      On surveillance for prostate cancer.  PSA has decreased to 3.7 8/23/24.  Last biopsy 8/11/2020 was benign.  He is on finasteride and alfuzosin.  Stable symptoms.         1. Malignant neoplasm of prostate (HCC)  Overview:  He underwent a prostate biopsy 8/31/18.   He had 1/12 zones positive 15% for Jae's 6 disease. He has been on surveillance.  Prolaris testing was less aggressive.  Repeat biopsy 8/11/20: benign.        Assessment & Plan:   H/o prostate cancer on surveillance.  No new concerns.  Given his age and social situation we are not pressed to reevaluate with biopsy.   Orders:  -     PSA, Diagnostic; Future  2. Elevated PSA  Overview:  He has a diagnosis of prostate cancer, 8/31/18, Jae 6. On 5ARI therapy.  2.4 3/6/19 on 5ARI.   PSA 9/17/19 was 3.1  PSA 3/11/2020 was 2.9  PSA 5/19/2020: 2.8  PSA 7/22/2020: 2.6  Prostate biopsy 8/11/20 was benign.  PSA 2/10/2021: 2.6 (on finasteride)  PSA 8/20/21: 2.9 (on finasteride)  PSA 2/9/22: 3.4 (on finasteride)  PSA 8/10/22: 3.2 (on finasteride)  PSA 2/20/23: 3.7  PSA 8/30/23: 3.6  PSA 2/19/24: 4.0  ExoDx on 3/29/24 with score of 29.8 (above cut-off)  PSA 8/23/24: 3.7    Assessment & Plan:  PSA stable.  On a 5ARI his adjusted risk

## 2024-09-06 NOTE — ASSESSMENT & PLAN NOTE
PSA stable.  On a 5ARI his adjusted risk is still elevated. His PSA is not concerning at this time.

## 2025-03-04 LAB — PSA SERPL-MCNC: 3.9 NG/ML (ref 0–4)

## 2025-03-06 DIAGNOSIS — R97.20 ELEVATED PSA: ICD-10-CM

## 2025-03-06 DIAGNOSIS — C61 MALIGNANT NEOPLASM OF PROSTATE (HCC): ICD-10-CM

## 2025-03-07 ENCOUNTER — OFFICE VISIT (OUTPATIENT)
Age: 80
End: 2025-03-07
Payer: MEDICARE

## 2025-03-07 VITALS
DIASTOLIC BLOOD PRESSURE: 78 MMHG | WEIGHT: 192 LBS | BODY MASS INDEX: 26.88 KG/M2 | HEART RATE: 68 BPM | SYSTOLIC BLOOD PRESSURE: 159 MMHG | HEIGHT: 71 IN

## 2025-03-07 DIAGNOSIS — R97.20 ELEVATED PSA: ICD-10-CM

## 2025-03-07 DIAGNOSIS — C61 MALIGNANT NEOPLASM OF PROSTATE (HCC): Primary | ICD-10-CM

## 2025-03-07 DIAGNOSIS — N40.0 BENIGN PROSTATIC HYPERPLASIA WITHOUT LOWER URINARY TRACT SYMPTOMS: ICD-10-CM

## 2025-03-07 PROCEDURE — 99214 OFFICE O/P EST MOD 30 MIN: CPT | Performed by: UROLOGY

## 2025-03-07 PROCEDURE — 1036F TOBACCO NON-USER: CPT | Performed by: UROLOGY

## 2025-03-07 PROCEDURE — G8419 CALC BMI OUT NRM PARAM NOF/U: HCPCS | Performed by: UROLOGY

## 2025-03-07 PROCEDURE — 1123F ACP DISCUSS/DSCN MKR DOCD: CPT | Performed by: UROLOGY

## 2025-03-07 PROCEDURE — G8427 DOCREV CUR MEDS BY ELIG CLIN: HCPCS | Performed by: UROLOGY

## 2025-03-07 PROCEDURE — 1159F MED LIST DOCD IN RCRD: CPT | Performed by: UROLOGY

## 2025-03-07 RX ORDER — METOPROLOL SUCCINATE 50 MG/1
TABLET, EXTENDED RELEASE ORAL
COMMUNITY
Start: 2025-01-09

## 2025-03-07 RX ORDER — METOPROLOL SUCCINATE 25 MG/1
TABLET, EXTENDED RELEASE ORAL
COMMUNITY
Start: 2025-02-25

## 2025-03-07 RX ORDER — ALFUZOSIN HYDROCHLORIDE 10 MG/1
10 TABLET, EXTENDED RELEASE ORAL DAILY
Qty: 90 TABLET | Refills: 3 | Status: SHIPPED | OUTPATIENT
Start: 2025-03-07

## 2025-03-07 RX ORDER — ALBUTEROL SULFATE 90 UG/1
INHALANT RESPIRATORY (INHALATION)
COMMUNITY

## 2025-03-07 RX ORDER — ROSUVASTATIN CALCIUM 20 MG/1
TABLET, COATED ORAL
COMMUNITY
Start: 2025-01-27

## 2025-03-07 RX ORDER — FINASTERIDE 5 MG/1
5 TABLET, FILM COATED ORAL DAILY
Qty: 90 TABLET | Refills: 3 | Status: SHIPPED | OUTPATIENT
Start: 2025-03-07

## 2025-03-07 NOTE — ASSESSMENT & PLAN NOTE
He was diagnosed with Jae 6 prostate cancer in 2018.  He has been on surveillance.  His PSA remains stable on finasteride.  Continue monitoring.

## 2025-03-07 NOTE — PROGRESS NOTES
Chief Complaint   Patient presents with    Follow-up     Malignant neoplasm of prostate     1. Have you been to the ER, urgent care clinic since your last visit?  Hospitalized since your last visit?No    2. Have you seen or consulted any other health care providers outside of the [facility] since your last visit?  Include any pap smears or colon screening. No  BP (!) 159/78 (Site: Left Upper Arm, Position: Sitting, Cuff Size: Large Adult)   Pulse 68   Ht 1.803 m (5' 11\")   Wt 87.1 kg (192 lb)   BMI 26.78 kg/m²     
benign.  PSA 2/10/2021: 2.6 (on finasteride)  PSA 8/20/21: 2.9 (on finasteride)  PSA 2/9/22: 3.4 (on finasteride)  PSA 8/10/22: 3.2 (on finasteride)  PSA 2/20/23: 3.7  PSA 8/30/23: 3.6  PSA 2/19/24: 4.0  ExoDx on 3/29/24 with score of 29.8 (above cut-off)  PSA 8/23/24: 3.7    Orders:  -     PSA, Diagnostic; Future  -     finasteride (PROSCAR) 5 MG tablet; Take 1 tablet by mouth daily, Disp-90 tablet, R-3Normal  3. Benign prostatic hyperplasia without lower urinary tract symptoms  Overview:  He is on alfuzosin and finasteride. His symptoms are not bothersome.  Prostate 42cc on u/s 8/11/2020.       Assessment & Plan:  Stable on alfuzosin and finasteride.  Continue medications.  Orders:  -     finasteride (PROSCAR) 5 MG tablet; Take 1 tablet by mouth daily, Disp-90 tablet, R-3Normal  -     alfuzosin (UROXATRAL) 10 MG extended release tablet; Take 1 tablet by mouth daily, Disp-90 tablet, R-3Normal      No Known Allergies   Prior to Admission medications    Medication Sig Start Date End Date Taking? Authorizing Provider   rosuvastatin (CRESTOR) 20 MG tablet  1/27/25  Yes ProviderAsha MD   metoprolol succinate (TOPROL XL) 50 MG extended release tablet  1/9/25  Yes ProviderAsha MD   metoprolol succinate (TOPROL XL) 25 MG extended release tablet  2/25/25  Yes ProviderAsha MD   albuterol sulfate HFA (PROAIR HFA) 108 (90 Base) MCG/ACT inhaler 1 puff as needed Inhalation every 4 hrs   Yes Asha Tijerina MD   finasteride (PROSCAR) 5 MG tablet Take 1 tablet by mouth daily 3/7/25  Yes Wili Salvador MD   alfuzosin (UROXATRAL) 10 MG extended release tablet Take 1 tablet by mouth daily 3/7/25  Yes Wili Salvador MD   aspirin 81 MG EC tablet Take by mouth daily   Yes Automatic Reconciliation, Ar   atorvastatin (LIPITOR) 20 MG tablet ceived the following from [facility]: Outside name: atorvastatin (LIPITOR) 20 mg tablet 5/24/20  Yes Automatic Reconciliation, Ar   clopidogrel (PLAVIX)